# Patient Record
Sex: FEMALE | Race: AMERICAN INDIAN OR ALASKA NATIVE | NOT HISPANIC OR LATINO | URBAN - METROPOLITAN AREA
[De-identification: names, ages, dates, MRNs, and addresses within clinical notes are randomized per-mention and may not be internally consistent; named-entity substitution may affect disease eponyms.]

---

## 2018-08-18 ENCOUNTER — OUTPATIENT (OUTPATIENT)
Dept: OUTPATIENT SERVICES | Facility: HOSPITAL | Age: 58
LOS: 1 days | Discharge: HOME | End: 2018-08-18

## 2018-08-18 DIAGNOSIS — R10.9 UNSPECIFIED ABDOMINAL PAIN: ICD-10-CM

## 2020-08-15 ENCOUNTER — OUTPATIENT (OUTPATIENT)
Dept: OUTPATIENT SERVICES | Facility: HOSPITAL | Age: 60
LOS: 1 days | Discharge: HOME | End: 2020-08-15
Payer: MEDICAID

## 2020-08-15 DIAGNOSIS — R07.9 CHEST PAIN, UNSPECIFIED: ICD-10-CM

## 2020-08-15 DIAGNOSIS — M25.569 PAIN IN UNSPECIFIED KNEE: ICD-10-CM

## 2020-08-15 PROCEDURE — 71046 X-RAY EXAM CHEST 2 VIEWS: CPT | Mod: 26

## 2020-08-15 PROCEDURE — 73562 X-RAY EXAM OF KNEE 3: CPT | Mod: 26,50

## 2020-09-18 ENCOUNTER — OUTPATIENT (OUTPATIENT)
Dept: OUTPATIENT SERVICES | Facility: HOSPITAL | Age: 60
LOS: 1 days | Discharge: HOME | End: 2020-09-18

## 2020-09-18 DIAGNOSIS — Z12.31 ENCOUNTER FOR SCREENING MAMMOGRAM FOR MALIGNANT NEOPLASM OF BREAST: ICD-10-CM

## 2020-09-21 DIAGNOSIS — Z13.820 ENCOUNTER FOR SCREENING FOR OSTEOPOROSIS: ICD-10-CM

## 2020-09-21 DIAGNOSIS — N95.9 UNSPECIFIED MENOPAUSAL AND PERIMENOPAUSAL DISORDER: ICD-10-CM

## 2020-11-28 ENCOUNTER — INPATIENT (INPATIENT)
Facility: HOSPITAL | Age: 60
LOS: 2 days | Discharge: HOME | End: 2020-12-01
Attending: NUCLEAR MEDICINE | Admitting: NUCLEAR MEDICINE
Payer: MEDICAID

## 2020-11-28 VITALS
DIASTOLIC BLOOD PRESSURE: 100 MMHG | TEMPERATURE: 98 F | SYSTOLIC BLOOD PRESSURE: 179 MMHG | RESPIRATION RATE: 18 BRPM | HEART RATE: 95 BPM | OXYGEN SATURATION: 99 %

## 2020-11-28 DIAGNOSIS — I11.0 HYPERTENSIVE HEART DISEASE WITH HEART FAILURE: ICD-10-CM

## 2020-11-28 DIAGNOSIS — R91.1 SOLITARY PULMONARY NODULE: ICD-10-CM

## 2020-11-28 DIAGNOSIS — R77.8 OTHER SPECIFIED ABNORMALITIES OF PLASMA PROTEINS: ICD-10-CM

## 2020-11-28 DIAGNOSIS — I50.30 UNSPECIFIED DIASTOLIC (CONGESTIVE) HEART FAILURE: ICD-10-CM

## 2020-11-28 DIAGNOSIS — R16.0 HEPATOMEGALY, NOT ELSEWHERE CLASSIFIED: ICD-10-CM

## 2020-11-28 DIAGNOSIS — R07.89 OTHER CHEST PAIN: ICD-10-CM

## 2020-11-28 DIAGNOSIS — I21.4 NON-ST ELEVATION (NSTEMI) MYOCARDIAL INFARCTION: ICD-10-CM

## 2020-11-28 DIAGNOSIS — M19.90 UNSPECIFIED OSTEOARTHRITIS, UNSPECIFIED SITE: ICD-10-CM

## 2020-11-28 DIAGNOSIS — E66.01 MORBID (SEVERE) OBESITY DUE TO EXCESS CALORIES: ICD-10-CM

## 2020-11-28 LAB
ALBUMIN SERPL ELPH-MCNC: 4.5 G/DL — SIGNIFICANT CHANGE UP (ref 3.5–5.2)
ALP SERPL-CCNC: 111 U/L — SIGNIFICANT CHANGE UP (ref 30–115)
ALT FLD-CCNC: 19 U/L — SIGNIFICANT CHANGE UP (ref 0–41)
ANION GAP SERPL CALC-SCNC: 11 MMOL/L — SIGNIFICANT CHANGE UP (ref 7–14)
AST SERPL-CCNC: 27 U/L — SIGNIFICANT CHANGE UP (ref 0–41)
BASOPHILS # BLD AUTO: 0.07 K/UL — SIGNIFICANT CHANGE UP (ref 0–0.2)
BASOPHILS NFR BLD AUTO: 0.8 % — SIGNIFICANT CHANGE UP (ref 0–1)
BILIRUB SERPL-MCNC: 0.3 MG/DL — SIGNIFICANT CHANGE UP (ref 0.2–1.2)
BUN SERPL-MCNC: 22 MG/DL — HIGH (ref 10–20)
CALCIUM SERPL-MCNC: 10 MG/DL — SIGNIFICANT CHANGE UP (ref 8.5–10.1)
CHLORIDE SERPL-SCNC: 103 MMOL/L — SIGNIFICANT CHANGE UP (ref 98–110)
CO2 SERPL-SCNC: 26 MMOL/L — SIGNIFICANT CHANGE UP (ref 17–32)
CREAT SERPL-MCNC: 0.8 MG/DL — SIGNIFICANT CHANGE UP (ref 0.7–1.5)
EOSINOPHIL # BLD AUTO: 1.62 K/UL — HIGH (ref 0–0.7)
EOSINOPHIL NFR BLD AUTO: 18.3 % — HIGH (ref 0–8)
GLUCOSE BLDC GLUCOMTR-MCNC: 96 MG/DL — SIGNIFICANT CHANGE UP (ref 70–99)
GLUCOSE SERPL-MCNC: 127 MG/DL — HIGH (ref 70–99)
HCT VFR BLD CALC: 39.8 % — SIGNIFICANT CHANGE UP (ref 37–47)
HGB BLD-MCNC: 13 G/DL — SIGNIFICANT CHANGE UP (ref 12–16)
IMM GRANULOCYTES NFR BLD AUTO: 1 % — HIGH (ref 0.1–0.3)
LYMPHOCYTES # BLD AUTO: 2.18 K/UL — SIGNIFICANT CHANGE UP (ref 1.2–3.4)
LYMPHOCYTES # BLD AUTO: 24.7 % — SIGNIFICANT CHANGE UP (ref 20.5–51.1)
MAGNESIUM SERPL-MCNC: 2.1 MG/DL — SIGNIFICANT CHANGE UP (ref 1.8–2.4)
MCHC RBC-ENTMCNC: 28.8 PG — SIGNIFICANT CHANGE UP (ref 27–31)
MCHC RBC-ENTMCNC: 32.7 G/DL — SIGNIFICANT CHANGE UP (ref 32–37)
MCV RBC AUTO: 88.1 FL — SIGNIFICANT CHANGE UP (ref 81–99)
MONOCYTES # BLD AUTO: 0.47 K/UL — SIGNIFICANT CHANGE UP (ref 0.1–0.6)
MONOCYTES NFR BLD AUTO: 5.3 % — SIGNIFICANT CHANGE UP (ref 1.7–9.3)
NEUTROPHILS # BLD AUTO: 4.4 K/UL — SIGNIFICANT CHANGE UP (ref 1.4–6.5)
NEUTROPHILS NFR BLD AUTO: 49.9 % — SIGNIFICANT CHANGE UP (ref 42.2–75.2)
NRBC # BLD: 0 /100 WBCS — SIGNIFICANT CHANGE UP (ref 0–0)
NT-PROBNP SERPL-SCNC: 1638 PG/ML — HIGH (ref 0–300)
PLATELET # BLD AUTO: 346 K/UL — SIGNIFICANT CHANGE UP (ref 130–400)
POTASSIUM SERPL-MCNC: 5.3 MMOL/L — HIGH (ref 3.5–5)
POTASSIUM SERPL-SCNC: 5.3 MMOL/L — HIGH (ref 3.5–5)
PROT SERPL-MCNC: 7.3 G/DL — SIGNIFICANT CHANGE UP (ref 6–8)
RAPID RVP RESULT: SIGNIFICANT CHANGE UP
RBC # BLD: 4.52 M/UL — SIGNIFICANT CHANGE UP (ref 4.2–5.4)
RBC # FLD: 13.5 % — SIGNIFICANT CHANGE UP (ref 11.5–14.5)
SARS-COV-2 RNA SPEC QL NAA+PROBE: SIGNIFICANT CHANGE UP
SODIUM SERPL-SCNC: 140 MMOL/L — SIGNIFICANT CHANGE UP (ref 135–146)
TROPONIN T SERPL-MCNC: 0.38 NG/ML — CRITICAL HIGH
WBC # BLD: 8.83 K/UL — SIGNIFICANT CHANGE UP (ref 4.8–10.8)
WBC # FLD AUTO: 8.83 K/UL — SIGNIFICANT CHANGE UP (ref 4.8–10.8)

## 2020-11-28 PROCEDURE — 93010 ELECTROCARDIOGRAM REPORT: CPT | Mod: 76

## 2020-11-28 PROCEDURE — 73030 X-RAY EXAM OF SHOULDER: CPT | Mod: 26,RT

## 2020-11-28 PROCEDURE — 71046 X-RAY EXAM CHEST 2 VIEWS: CPT | Mod: 26

## 2020-11-28 PROCEDURE — 99291 CRITICAL CARE FIRST HOUR: CPT

## 2020-11-28 RX ORDER — METOPROLOL TARTRATE 50 MG
25 TABLET ORAL
Refills: 0 | Status: DISCONTINUED | OUTPATIENT
Start: 2020-11-28 | End: 2020-12-01

## 2020-11-28 RX ORDER — CHLORHEXIDINE GLUCONATE 213 G/1000ML
1 SOLUTION TOPICAL
Refills: 0 | Status: DISCONTINUED | OUTPATIENT
Start: 2020-11-28 | End: 2020-12-01

## 2020-11-28 RX ORDER — PANTOPRAZOLE SODIUM 20 MG/1
40 TABLET, DELAYED RELEASE ORAL
Refills: 0 | Status: DISCONTINUED | OUTPATIENT
Start: 2020-11-28 | End: 2020-12-01

## 2020-11-28 RX ORDER — GEMFIBROZIL 600 MG
600 TABLET ORAL
Refills: 0 | Status: DISCONTINUED | OUTPATIENT
Start: 2020-11-28 | End: 2020-11-29

## 2020-11-28 RX ORDER — ASPIRIN/CALCIUM CARB/MAGNESIUM 324 MG
325 TABLET ORAL ONCE
Refills: 0 | Status: COMPLETED | OUTPATIENT
Start: 2020-11-28 | End: 2020-11-28

## 2020-11-28 RX ORDER — ATORVASTATIN CALCIUM 80 MG/1
40 TABLET, FILM COATED ORAL AT BEDTIME
Refills: 0 | Status: DISCONTINUED | OUTPATIENT
Start: 2020-11-28 | End: 2020-12-01

## 2020-11-28 RX ORDER — ENOXAPARIN SODIUM 100 MG/ML
40 INJECTION SUBCUTANEOUS DAILY
Refills: 0 | Status: DISCONTINUED | OUTPATIENT
Start: 2020-11-28 | End: 2020-11-29

## 2020-11-28 RX ORDER — ASPIRIN/CALCIUM CARB/MAGNESIUM 324 MG
81 TABLET ORAL DAILY
Refills: 0 | Status: DISCONTINUED | OUTPATIENT
Start: 2020-11-28 | End: 2020-12-01

## 2020-11-28 RX ADMIN — Medication 25 MILLIGRAM(S): at 22:37

## 2020-11-28 RX ADMIN — Medication 325 MILLIGRAM(S): at 19:42

## 2020-11-28 NOTE — H&P ADULT - NSHPLABSRESULTS_GEN_ALL_CORE
VITAL SIGNS: Last 24 Hours  T(C): 36.8 (28 Nov 2020 21:25), Max: 36.8 (28 Nov 2020 17:52)  T(F): 98.2 (28 Nov 2020 21:25), Max: 98.3 (28 Nov 2020 17:52)  HR: 87 (28 Nov 2020 21:25) (82 - 95)  BP: 169/100 (28 Nov 2020 21:25) (134/76 - 179/100)  BP(mean): 129 (28 Nov 2020 21:25) (100 - 129)  RR: 18 (28 Nov 2020 21:25) (16 - 18)  SpO2: 100% (28 Nov 2020 21:25) (98% - 100%)    LABS:                        13.0   8.83  )-----------( 346      ( 28 Nov 2020 18:35 )             39.8     11-28    140  |  103  |  22<H>  ----------------------------<  127<H>  5.3<H>   |  26  |  0.8    Ca    10.0      28 Nov 2020 18:35  Mg     2.1     11-28    TPro  7.3  /  Alb  4.5  /  TBili  0.3  /  DBili  x   /  AST  27  /  ALT  19  /  AlkPhos  111  11-28          Troponin T, Serum: 0.38 ng/mL <HH> (11-28-20 @ 18:35)      CARDIAC MARKERS ( 28 Nov 2020 18:35 )  x     / 0.38 ng/mL / x     / x     / x

## 2020-11-28 NOTE — ED PROVIDER NOTE - ATTENDING CONTRIBUTION TO CARE
I personally evaluated the patient. I reviewed the Resident’s or Physician Assistant’s note (as assigned above), and agree with the findings and plan except as documented in my note.  60 year old female with a pmh of osteoarthritis presents here for chest pain. Patient states she began having right sided chest pain radiating down her arm that began last night. Pain is 8/10 sharp in nature associated with some sob. Patient follows with Dr. Tom and had a negative CCTA in 2018. Patient denies any fever chills cough n/v abdominal pain.  On exam  CONSTITUTIONAL: WA / WN / NAD  HEAD: NCAT  EYES: PERRL; EOMI;   ENT: Normal pharynx; mucous membranes pink/moist, no erythema.  NECK: Supple; no meningeal signs  CARD: RRR; nl S1/S2; no M/R/G.   RESP: Respiratory rate and effort are normal; breath sounds clear and equal bilaterally.  ABD: Soft, NT ND  MSK/EXT: No gross deformities; full range of motion.  SKIN: Warm and dry;   NEURO: AAOx3  PSYCH: Memory Intact, Normal Affect

## 2020-11-28 NOTE — ED ADULT NURSE REASSESSMENT NOTE - NS ED NURSE REASSESS COMMENT FT1
Pt hand off report received from Ni JORDAN RN. IV flushing w positive blood return, dressing dry and intact. Pt admitted to bedside cardiac monitor. Pt updated on continuing plan of care, awaiting further orders, will continue to monitor/assess.

## 2020-11-28 NOTE — H&P ADULT - ASSESSMENT
IMPRESSION:    NSTEMI      PLAN:    CNS:     HEENT:    PULMONARY:    CARDIOVASCULAR:    GI: GI prophylaxis.  Feeding     RENAL:    INFECTIOUS DISEASE:    HEMATOLOGICAL:  DVT prophylaxis.    ENDOCRINE:  Follow up FS.  Insulin protocol if needed.    MUSCULOSKELETAL:    TIME SPENT:          #Misc  - DVT Prophylaxis:  - Diet:  - GI Prophylaxis:  - Activity:  - IV Fluids:  - Code Status:    Dispo: IMPRESSION:    NSTEMI  Undiagnosed HTN  Hx of osteoarthritis       PLAN:    CNS: Avoid CNS depressants     HEENT: Oral Care     PULMONARY: Montior Pulse Ox. Keep above 93 %    CARDIOVASCULAR:  - Denies active chest pain  - Troponin 0.38. Follow repeat Troponin. If uptrending Therapeutic Lovenox   - EKG non-ischemic. Follow repeat EKG in AM  - s/p 325mg ASA in the ED. Will start ASA 81mg PO QD  - Will start Lipitor 40mg PO QHS and Metoprolol 25mg po BID  - NPO after midnight for possible CCTA in AM  - Follow lipid profile in AM    GI: GI prophylaxis. DASH/TLC diet. NPO after midnight     RENAL: Monitor lytes. Replete as needed     INFECTIOUS DISEASE: Afebrile    HEMATOLOGICAL: DVT prophylaxis.    ENDOCRINE: Follow up FS daily. Follow HbA1C in AM    MUSCULOSKELETAL: AAT.     FULL CODE     59 YO F with PMHx of osteoarthritis presented to the ED with right sided chest pain.    IMPRESSION:  Atypical Chest Pain  NSTEMI  Undiagnosed HTN  Hx of osteoarthritis       PLAN:    CNS: Avoid CNS depressants     HEENT: Oral Care     PULMONARY: Monitor Pulse Ox. Keep above 93 %    CARDIOVASCULAR:  - Troponin 0.38. Follow repeat Troponin. If increasing will start on therapeutic Lovenox   - EKG non-ischemic. Follow repeat EKG in AM  - s/p 325mg ASA in the ED. Will start ASA 81mg PO QD  - Will start Lipitor 40mg PO QHS and Metoprolol 25mg PO BID  - NPO after midnight for CCTA in AM  - Follow lipid profile in AM    GI: GI prophylaxis. DASH/TLC diet. NPO after midnight     RENAL: Monitor lytes. Replete as needed     INFECTIOUS DISEASE: Afebrile    HEMATOLOGICAL: Lovenox for DVT prophylaxis.    ENDOCRINE: Follow up FS daily. Follow HbA1C in AM    MUSCULOSKELETAL: AAT    FULL CODE     61 YO F with PMHx of osteoarthritis presented to the ED with right sided chest pain.    IMPRESSION:  Atypical Chest Pain  NSTEMI  Undiagnosed HTN  Hx of osteoarthritis       PLAN:    CNS: Avoid CNS depressants     HEENT: Oral Care     PULMONARY: Monitor Pulse Ox. Keep above 93 %    CARDIOVASCULAR:  - Troponin 0.38. Follow repeat Troponin. If increasing will start on therapeutic Lovenox   - EKG non-ischemic. Follow repeat EKG in AM  - s/p 325mg ASA in the ED. Will start ASA 81mg PO QD  - Will start Lipitor 40mg PO QHS and Metoprolol 25mg PO BID  - NPO after midnight for CCTA in AM  - Echo  - Follow lipid profile in AM    GI: GI prophylaxis. DASH/TLC diet. NPO after midnight     RENAL: Monitor lytes. Replete as needed     INFECTIOUS DISEASE: Afebrile    HEMATOLOGICAL: Lovenox for DVT prophylaxis.    ENDOCRINE: Follow up FS daily. Follow HbA1C in AM    MUSCULOSKELETAL: AAT    FULL CODE     59 YO F with PMHx of osteoarthritis presented to the ED with right sided chest pain.    IMPRESSION:  Atypical Chest Pain  NSTEMI  Undiagnosed HTN  Hx of osteoarthritis       PLAN:    CNS: Avoid CNS depressants     HEENT: Oral Care     PULMONARY: Monitor Pulse Ox. Keep above 93 %    CARDIOVASCULAR:  - Troponin 0.38. Follow repeat Troponin. If increasing will start on therapeutic Lovenox   - EKG non-ischemic. Follow repeat EKG in AM  - s/p 325mg ASA in the ED. Will start ASA 81mg PO QD  - Will start Lipitor 40mg PO QHS and Metoprolol 25mg PO BID  - NPO after midnight for CCTA in AM  - Follow 2D Echo  - Follow lipid profile in AM    GI: GI prophylaxis. DASH/TLC diet. NPO after midnight     RENAL: Monitor lytes. Replete as needed     INFECTIOUS DISEASE: Afebrile    HEMATOLOGICAL: Lovenox for DVT prophylaxis.    ENDOCRINE: Follow up FS daily. Follow HbA1C in AM    MUSCULOSKELETAL: AAT    FULL CODE     59 YO F with PMHx of osteoarthritis presented to the ED with right sided chest pain.    IMPRESSION:  Atypical Chest Pain  Elevated troponins  HTN  Hx of osteoarthritis       PLAN:    CNS: Avoid CNS depressants     HEENT: Oral Care     PULMONARY: Monitor Pulse Ox. Keep above 93 %    CARDIOVASCULAR:  - Troponin 0.38. Follow repeat Troponin. If increasing will start on therapeutic Lovenox   - EKG non-ischemic. continue to trend ekg for any dynamic changes.   - s/p 325mg ASA in the ED. Will start ASA 81mg PO QD  - Will start Lipitor 40mg PO QHS and Metoprolol 25mg PO BID  - NPO after midnight for CCTA in AM  - tte pending  - check d-dimer and esr / crp    GI: GI prophylaxis. DASH/TLC diet. NPO after midnight     RENAL: Monitor lytes. Replete as needed     INFECTIOUS DISEASE: Afebrile    HEMATOLOGICAL: Lovenox for DVT prophylaxis.    ENDOCRINE: Follow up FS daily. Follow HbA1C in AM    MUSCULOSKELETAL: AAT    FULL CODE     61 YO F with PMHx of osteoarthritis presented to the ED with right sided chest pain.    IMPRESSION:  Atypical Chest Pain  Elevated troponins  HTN  Hx of osteoarthritis       PLAN:    CNS: Avoid CNS depressants     HEENT: Oral Care     PULMONARY: Monitor Pulse Ox. Keep above 93 %    CARDIOVASCULAR:  - Troponin 0.38. Follow repeat Troponin. If increasing will start on therapeutic Lovenox   - EKG non-ischemic. continue to trend ekg for any dynamic changes.   - s/p 325mg ASA in the ED. Will start ASA 81mg PO QD  - Will start Lipitor 40mg PO QHS and Metoprolol 25mg PO BID  - NPO after midnight for CCTA in AM  - tte pending  - check d-dimer and esr / crp  - 2D echo to assess for Takotsubo    GI: GI prophylaxis. DASH/TLC diet. NPO after midnight     RENAL: Monitor lytes. Replete as needed     INFECTIOUS DISEASE: Afebrile    HEMATOLOGICAL: Lovenox for DVT prophylaxis.    ENDOCRINE: Follow up FS daily. Follow HbA1C in AM    MUSCULOSKELETAL: AAT    FULL CODE

## 2020-11-28 NOTE — H&P ADULT - HISTORY OF PRESENT ILLNESS
59 YO F with PMHx of osteoarthritis (takes Naproxen) presented to the ED with chest pain. Chest pain started the night before presentation, right sided radiating to right shoulder and right arm. Pain is not related to  exertion. Patient states she had these episodes of right sided chest pain for around 10-12 years and normalli it goes away on its own after a few minutes. This episode persisted throughout the day so patient came to ED  Patient reports she had a fracture of her right forearm around 10 years ago while shoveling snow and has had right arm pain intermittently since then.   Patient reports history of chronic SOB on related to exertion but all workup has been negative.   Patient denies fevers/chills, nausea, vomiting, abdominal pain.   Patient reports that on her way to the hospital she found out that her brother   In the ED vitals were sig for a BP of 170/100 mmHg. Labs were sig for Troponin of 0.38 and BNP of 1638.  Patient's cardiologist is Dr. Gonzales. ED spoke with Dr Gonzales and he stated that patient had a negative CCTA in 10/2018. He recommended repeat troponin and if uptrending start therapeutic Lovenox 59 YO F with PMHx of osteoarthritis (takes Naproxen) presented to the ED with chest pain. Chest pain started the night before presentation, right sided, radiating to right shoulder and right arm. Pain is not related to exertion and has no aggravating and alleviating factors. She was started on Neproxen 2 months ago by her PCP for musculoskeletal pain and reports no improvement in pain since then. Patient states she had these episodes of right sided chest pain for around 10-12 years and normally it goes away on its own after a few minutes. This episode persisted throughout the day so patient came to ED  She had a fracture of her right forearm around 10 years ago while shoveling snow and has had right arm pain intermittently since then.   She also reports history of chronic SOB related to exertion but all workup has been negative.   Denies fevers/chills, nausea, vomiting, abdominal pain.   Patient reports that on her way to the hospital she found out that her brother   In the ED vitals were sig for a BP of 170/100 mmHg. Labs were sig for Troponin of 0.38 and BNP of 1638.  Patient's cardiologist is Dr. Gonzales. ED spoke with Dr Gonzales and he stated that patient had a negative CCTA in 10/2018. He recommended repeat troponin and if uptrending start therapeutic Lovenox 61 YO F with PMHx of osteoarthritis presented to the ED with chest pain. Chest pain started the night before presentation, right sided, radiating to right shoulder and right arm. Pain is not related to exertion and has no aggravating and alleviating factors. She was started on Neproxen 2 months ago by her PCP for musculoskeletal pain and reports no improvement in pain since then. Patient states she had these episodes of right sided chest pain for around 10-12 years and normally it goes away on its own after a few minutes. This episode persisted throughout the day so patient came to ED  She had a fracture of her right forearm around 10 years ago while shoveling snow and has had right arm pain intermittently since then.   She also reports history of chronic SOB related to exertion but all workup has been negative.   Denies fevers/chills, nausea, vomiting, abdominal pain.   Patient reports that on her way to the hospital she found out that her brother   In the ED vitals were sig for a BP of 170/100 mmHg. Labs were sig for Troponin of 0.38 and BNP of 1638.  Patient's cardiologist is Dr. Gonzales. ED spoke with Dr Gonzales and he stated that patient had a negative CCTA in 10/2018. He recommended repeat troponin and if uptrending start therapeutic Lovenox 61 YO F with PMHx of osteoarthritis presented to the ED with chest pain. Chest pain started the night before presentation, right sided, radiating to right shoulder and right arm. Pain is not related to exertion and has no aggravating and alleviating factors.   Patient states she had these episodes of right sided chest pain for around 10-12 years and normally it goes away on its own after a few minutes. She had a fracture of her right forearm around 10 years ago while shoveling snow and has had right arm pain intermittently since then. This episode persisted throughout the day so patient came to ED. She was started on Naproxen 2 months ago by her PCP for musculoskeletal pain and reports no improvement in pain since then.    She also reports history of chronic SOB related to exertion but all workup has been negative.   Denies fevers/chills, nausea, vomiting, abdominal pain.   Patient reports that on her way to the hospital she found out that her brother   In the ED vitals were sig for a BP of 170/100 mmHg. Labs were sig for Troponin of 0.38 and BNP of 1638.  Patient's cardiologist is Dr. Gonzales. ED spoke with Dr Gonzales and he stated that patient had a negative CCTA in 10/2018. He recommended repeat troponin and if uptrending start therapeutic Lovenox 61 YO F with PMHx of osteoarthritis presented to the ED with chest pain. Chest pain started the night before presentation, right sided, radiating to right shoulder and right arm. Pain is not related to exertion and has no aggravating and alleviating factors.   Patient states she had these episodes of right sided chest pain for around 10-12 years and normally it goes away on its own after a few minutes. She had a fracture of her right forearm around 10 years ago while shoveling snow and has had right arm pain intermittently since then. This episode persisted throughout the day so patient came to ED. She was started on Naproxen 2 months ago by her PCP for musculoskeletal pain and reports no improvement in pain since then.    She also reports history of chronic SOB related to exertion but all workup has been negative.   Denies fevers/chills, nausea, vomiting, abdominal pain.   Patient reports that on her way to the hospital she found out that her brother   In the ED vitals were sig for a BP of 170/100 mmHg. Labs were sig for Troponin of 0.38 and BNP of 1638.  Patient's cardiologist is Dr. Gonzales. ED spoke with Dr Gonzales and he stated that patient had a negative CCTA in 10/2018. He recommended repeat troponin and if uptrending start therapeutic Lovenox

## 2020-11-28 NOTE — H&P ADULT - NSHPPHYSICALEXAM_GEN_ALL_CORE
CONSTITUTIONAL: No acute distress, well-developed, well-groomed, AAOx3  HEAD: Atraumatic, normocephalic  EYES: EOM intact, PERRLA, conjunctiva and sclera clear  ENT: Supple, no masses, no thyromegaly, no bruits, no JVD; moist mucous membranes  PULMONARY: Clear to auscultation bilaterally; no wheezes, rales, or rhonchi  CARDIOVASCULAR: Regular rate and rhythm; no murmurs, rubs, or gallops  GASTROINTESTINAL: Soft, non-tender, non-distended; bowel sounds present  MUSCULOSKELETAL: 2+ peripheral pulses; no clubbing, no cyanosis, no edema  NEUROLOGY: non-focal  SKIN: No rashes or lesions; warm and dry CONSTITUTIONAL: No acute distress, well-developed, well-groomed, AAOx3  HEAD: Atraumatic, normocephalic  EYES: EOM intact, PERRLA, conjunctiva and sclera clear  ENT: Supple, no masses, no thyromegaly, no bruits, no JVD; moist mucous membranes  PULMONARY: Clear to auscultation bilaterally; no wheezes, rales, or rhonchi  CARDIOVASCULAR: Regular rate and rhythm; no murmurs, rubs, or gallops  GASTROINTESTINAL: Soft, non-tender, non-distended; bowel sounds present  MUSCULOSKELETAL: Painful right shoulder movement   NEUROLOGY: non-focal  SKIN: No rashes or lesions; warm and dry

## 2020-11-28 NOTE — H&P ADULT - ATTENDING COMMENTS
Patient seen and examined in the CCU. Discussed with the CCU team on rounds.  Elevated troponins, suggestive of NSTEMI, trended up. However had negative CCTA 2 years ago.  Possible etiologies include development of CAD and NSTEMI, Printzmetal, myocarditis/pericarditis, Takotsubo CM, PE    Repeat troponin  Repeat ECG  CCTA pending  2D echo today to assess LV wall motions  D-dimer  ESR/CRP  Further recommendations after the CTA.

## 2020-11-28 NOTE — ED PROVIDER NOTE - OBJECTIVE STATEMENT
The patient is a 60 year old female with a history of osteoarthritis, right forearm fracture present The patient is a 60 year old female with a history of osteoarthritis presenting for chest pain. Symptoms began this morning, right sided chest pain radiating to right shoulder and right scapular pain; pain is exertional, moderate. States she has had this chest pain ~10-15 years which normally goes away on its own after a few minutes. Chest pain persisted throughout the day so came to ED for eval. Pt also states she fractured her forearm ~10 years ago and has had right arm pain intermittently for years. Associated with shortness of breath on exertion. No fevers/chills, nausea, vomiting, abdominal pain. Pt States she found out her brother  on the way to the hospital. Pt's cardiologist is Dr. Gonzales; had a negative CCTA in 10/2018.

## 2020-11-28 NOTE — ED PROVIDER NOTE - NS ED ROS FT
Eyes:  No visual changes, eye pain or discharge.  ENMT:  No hearing changes, pain, discharge or infections. No neck pain or stiffness.  Cardiac: +chest pain, +SOB + chest pain with exertion.  Respiratory:  No cough or respiratory distress. No hemoptysis. No history of asthma or RAD.  GI:  No nausea, vomiting, diarrhea or abdominal pain.  :  No dysuria, frequency or burning.  MS:  No myalgia, muscle weakness, joint pain or back pain.  Neuro:  No headache or weakness.  No LOC.  Skin:  No skin rash.   Endocrine: No history of thyroid disease or diabetes.

## 2020-11-28 NOTE — ED ADULT NURSE NOTE - CHIEF COMPLAINT QUOTE
pt with c/o right sided chest pain radiating down the right arm. pt with hx of chest pain. pt with c/o sob at times.    son [phone]

## 2020-11-28 NOTE — ED PROVIDER NOTE - PROGRESS NOTE DETAILS
TA: Elevated trop on labs; spoke with patient and patient's son that patient will need to be admitted; placed cardiology consult and call out for patient's cardiologist Dr. Gonzales. TA: Spoke with Dr. Gonzales; stated he will call back with recommendations. TA: Dr. Gonzales recommended CCU admission; stated patient had negative CCTA in 10/2018; recommended second trop in 3 hrs and start lovenox if troponin is increasing. Recommended only aspirin as antiplatet agent. Called cardiology fellow (Tereza) who will see pt in ED. Will admit to CCU under Dr. Cameron. TA: bedside echo within normal limits.

## 2020-11-28 NOTE — ED ADULT TRIAGE NOTE - CHIEF COMPLAINT QUOTE
pt with c/o right sided chest pain radiating down the right arm. pt with hx of chest pain. pt with c/o sob at times. pt with c/o right sided chest pain radiating down the right arm. pt with hx of chest pain. pt with c/o sob at times.    son [phone]

## 2020-11-28 NOTE — ED PROVIDER NOTE - CARE PLAN
Principal Discharge DX:	NSTEMI (non-ST elevated myocardial infarction)   Principal Discharge DX:	NSTEMI (non-ST elevated myocardial infarction)  Secondary Diagnosis:	Chest pain  Secondary Diagnosis:	Elevated troponin

## 2020-11-28 NOTE — ED PROVIDER NOTE - CLINICAL SUMMARY MEDICAL DECISION MAKING FREE TEXT BOX
60 year old female with a pmh of osteoarthritis presents here for chest pain. Patient states she began having right sided chest pain radiating down her arm that began last night. Pain is 8/10 sharp in nature associated with some sob. VS reviewed. Labs imaging ekg obtained and reviewed. Patient found to have a positive troponin. Dr. Tom was called aware of results. Requesting patient only receive aspirin, no heparin/lovenox unless the troponin increases on the repeat. Cardiology fellow consulted. Patient admitted to the CCU for NSTEMI.

## 2020-11-28 NOTE — ED PROVIDER NOTE - PHYSICAL EXAMINATION
CONSTITUTIONAL: elderly female laying in stretcher; in no acute distress.   SKIN: warm, dry  HEAD: Normocephalic; atraumatic.  EYES: normal sclera and conjunctiva   ENT: No nasal discharge; airway clear.  NECK: Supple; non tender.  CARD: S1, S2 normal; no murmurs, gallops, or rubs. Regular rate and rhythm.   RESP: No wheezes, rales or rhonchi.  ABD: soft ntnd  EXT: Normal ROM.  +mild ttp of right humerus.  b/l radial pulses 2+ equal.   LYMPH: No acute cervical adenopathy.  NEURO: Alert, oriented, grossly unremarkable  PSYCH: Cooperative, appropriate.

## 2020-11-28 NOTE — H&P ADULT - NSHPREVIEWOFSYSTEMS_GEN_ALL_CORE
CONSTITUTIONAL: Weakness  EYES: No visual changes, eye pain, or discharge  ENT: No vertigo; No ear pain or change in hearing; No sore throat or difficulty swallowing  NECK: No pain or stiffness  RESPIRATORY: No cough, wheezing, or hemoptysis; No shortness of breath  CARDIOVASCULAR: Chest pain  GASTROINTESTINAL: No abdominal or epigastric pain; No nausea, vomiting, or hematemesis; No diarrhea or constipation; No melena or hematochezia  GENITOURINARY: No dysuria, frequency or hematuria  MUSCULOSKELETAL: Muscle pain  NEUROLOGICAL: No numbness or weakness  SKIN: No itching or rashes

## 2020-11-29 LAB
ALBUMIN SERPL ELPH-MCNC: 4 G/DL — SIGNIFICANT CHANGE UP (ref 3.5–5.2)
ALBUMIN SERPL ELPH-MCNC: 4.2 G/DL — SIGNIFICANT CHANGE UP (ref 3.5–5.2)
ALP SERPL-CCNC: 101 U/L — SIGNIFICANT CHANGE UP (ref 30–115)
ALP SERPL-CCNC: 102 U/L — SIGNIFICANT CHANGE UP (ref 30–115)
ALT FLD-CCNC: 17 U/L — SIGNIFICANT CHANGE UP (ref 0–41)
ALT FLD-CCNC: 18 U/L — SIGNIFICANT CHANGE UP (ref 0–41)
ANION GAP SERPL CALC-SCNC: 10 MMOL/L — SIGNIFICANT CHANGE UP (ref 7–14)
ANION GAP SERPL CALC-SCNC: 11 MMOL/L — SIGNIFICANT CHANGE UP (ref 7–14)
APTT BLD: 37.6 SEC — SIGNIFICANT CHANGE UP (ref 27–39.2)
AST SERPL-CCNC: 25 U/L — SIGNIFICANT CHANGE UP (ref 0–41)
AST SERPL-CCNC: 26 U/L — SIGNIFICANT CHANGE UP (ref 0–41)
BILIRUB SERPL-MCNC: 0.2 MG/DL — SIGNIFICANT CHANGE UP (ref 0.2–1.2)
BILIRUB SERPL-MCNC: 0.3 MG/DL — SIGNIFICANT CHANGE UP (ref 0.2–1.2)
BLD GP AB SCN SERPL QL: SIGNIFICANT CHANGE UP
BUN SERPL-MCNC: 17 MG/DL — SIGNIFICANT CHANGE UP (ref 10–20)
BUN SERPL-MCNC: 18 MG/DL — SIGNIFICANT CHANGE UP (ref 10–20)
CALCIUM SERPL-MCNC: 9.6 MG/DL — SIGNIFICANT CHANGE UP (ref 8.5–10.1)
CALCIUM SERPL-MCNC: 9.7 MG/DL — SIGNIFICANT CHANGE UP (ref 8.5–10.1)
CHLORIDE SERPL-SCNC: 103 MMOL/L — SIGNIFICANT CHANGE UP (ref 98–110)
CHLORIDE SERPL-SCNC: 107 MMOL/L — SIGNIFICANT CHANGE UP (ref 98–110)
CHOLEST SERPL-MCNC: 139 MG/DL — SIGNIFICANT CHANGE UP
CK MB CFR SERPL CALC: 8.9 NG/ML — HIGH (ref 0.6–6.3)
CK MB CFR SERPL CALC: 8.9 NG/ML — HIGH (ref 0.6–6.3)
CK SERPL-CCNC: 110 U/L — SIGNIFICANT CHANGE UP (ref 0–225)
CK SERPL-CCNC: 113 U/L — SIGNIFICANT CHANGE UP (ref 0–225)
CO2 SERPL-SCNC: 23 MMOL/L — SIGNIFICANT CHANGE UP (ref 17–32)
CO2 SERPL-SCNC: 25 MMOL/L — SIGNIFICANT CHANGE UP (ref 17–32)
CREAT SERPL-MCNC: 0.7 MG/DL — SIGNIFICANT CHANGE UP (ref 0.7–1.5)
CREAT SERPL-MCNC: 0.8 MG/DL — SIGNIFICANT CHANGE UP (ref 0.7–1.5)
GLUCOSE SERPL-MCNC: 116 MG/DL — HIGH (ref 70–99)
GLUCOSE SERPL-MCNC: 132 MG/DL — HIGH (ref 70–99)
HCT VFR BLD CALC: 39.7 % — SIGNIFICANT CHANGE UP (ref 37–47)
HDLC SERPL-MCNC: 43 MG/DL — LOW
HGB BLD-MCNC: 12.9 G/DL — SIGNIFICANT CHANGE UP (ref 12–16)
INR BLD: 1.11 RATIO — SIGNIFICANT CHANGE UP (ref 0.65–1.3)
LIPID PNL WITH DIRECT LDL SERPL: 92 MG/DL — SIGNIFICANT CHANGE UP
MAGNESIUM SERPL-MCNC: 2.1 MG/DL — SIGNIFICANT CHANGE UP (ref 1.8–2.4)
MCHC RBC-ENTMCNC: 28.4 PG — SIGNIFICANT CHANGE UP (ref 27–31)
MCHC RBC-ENTMCNC: 32.5 G/DL — SIGNIFICANT CHANGE UP (ref 32–37)
MCV RBC AUTO: 87.3 FL — SIGNIFICANT CHANGE UP (ref 81–99)
NON HDL CHOLESTEROL: 96 MG/DL — SIGNIFICANT CHANGE UP
NRBC # BLD: 0 /100 WBCS — SIGNIFICANT CHANGE UP (ref 0–0)
PLATELET # BLD AUTO: 339 K/UL — SIGNIFICANT CHANGE UP (ref 130–400)
POTASSIUM SERPL-MCNC: 4.3 MMOL/L — SIGNIFICANT CHANGE UP (ref 3.5–5)
POTASSIUM SERPL-MCNC: 4.8 MMOL/L — SIGNIFICANT CHANGE UP (ref 3.5–5)
POTASSIUM SERPL-SCNC: 4.3 MMOL/L — SIGNIFICANT CHANGE UP (ref 3.5–5)
POTASSIUM SERPL-SCNC: 4.8 MMOL/L — SIGNIFICANT CHANGE UP (ref 3.5–5)
PROT SERPL-MCNC: 6.7 G/DL — SIGNIFICANT CHANGE UP (ref 6–8)
PROT SERPL-MCNC: 6.9 G/DL — SIGNIFICANT CHANGE UP (ref 6–8)
PROTHROM AB SERPL-ACNC: 12.8 SEC — SIGNIFICANT CHANGE UP (ref 9.95–12.87)
RBC # BLD: 4.55 M/UL — SIGNIFICANT CHANGE UP (ref 4.2–5.4)
RBC # FLD: 13.5 % — SIGNIFICANT CHANGE UP (ref 11.5–14.5)
SODIUM SERPL-SCNC: 138 MMOL/L — SIGNIFICANT CHANGE UP (ref 135–146)
SODIUM SERPL-SCNC: 141 MMOL/L — SIGNIFICANT CHANGE UP (ref 135–146)
TRIGL SERPL-MCNC: 111 MG/DL — SIGNIFICANT CHANGE UP
TROPONIN T SERPL-MCNC: 0.31 NG/ML — CRITICAL HIGH
TROPONIN T SERPL-MCNC: 0.31 NG/ML — CRITICAL HIGH
TROPONIN T SERPL-MCNC: 0.41 NG/ML — CRITICAL HIGH
TROPONIN T SERPL-MCNC: 0.44 NG/ML — CRITICAL HIGH
WBC # BLD: 9.6 K/UL — SIGNIFICANT CHANGE UP (ref 4.8–10.8)
WBC # FLD AUTO: 9.6 K/UL — SIGNIFICANT CHANGE UP (ref 4.8–10.8)

## 2020-11-29 PROCEDURE — 71045 X-RAY EXAM CHEST 1 VIEW: CPT | Mod: 26

## 2020-11-29 PROCEDURE — 99291 CRITICAL CARE FIRST HOUR: CPT

## 2020-11-29 PROCEDURE — 76705 ECHO EXAM OF ABDOMEN: CPT | Mod: 26

## 2020-11-29 PROCEDURE — 93010 ELECTROCARDIOGRAM REPORT: CPT

## 2020-11-29 PROCEDURE — 93306 TTE W/DOPPLER COMPLETE: CPT | Mod: 26

## 2020-11-29 RX ORDER — SODIUM CHLORIDE 9 MG/ML
1000 INJECTION INTRAMUSCULAR; INTRAVENOUS; SUBCUTANEOUS
Refills: 0 | Status: DISCONTINUED | OUTPATIENT
Start: 2020-11-30 | End: 2020-12-01

## 2020-11-29 RX ORDER — ENOXAPARIN SODIUM 100 MG/ML
110 INJECTION SUBCUTANEOUS
Refills: 0 | Status: DISCONTINUED | OUTPATIENT
Start: 2020-11-29 | End: 2020-11-30

## 2020-11-29 RX ORDER — SODIUM CHLORIDE 9 MG/ML
1000 INJECTION INTRAMUSCULAR; INTRAVENOUS; SUBCUTANEOUS
Refills: 0 | Status: DISCONTINUED | OUTPATIENT
Start: 2020-11-29 | End: 2020-11-29

## 2020-11-29 RX ADMIN — Medication 600 MILLIGRAM(S): at 17:56

## 2020-11-29 RX ADMIN — ENOXAPARIN SODIUM 110 MILLIGRAM(S): 100 INJECTION SUBCUTANEOUS at 17:56

## 2020-11-29 RX ADMIN — ENOXAPARIN SODIUM 110 MILLIGRAM(S): 100 INJECTION SUBCUTANEOUS at 04:41

## 2020-11-29 RX ADMIN — Medication 25 MILLIGRAM(S): at 17:56

## 2020-11-29 RX ADMIN — Medication 25 MILLIGRAM(S): at 05:05

## 2020-11-29 RX ADMIN — Medication 81 MILLIGRAM(S): at 11:04

## 2020-11-29 RX ADMIN — ATORVASTATIN CALCIUM 40 MILLIGRAM(S): 80 TABLET, FILM COATED ORAL at 21:28

## 2020-11-29 RX ADMIN — Medication 600 MILLIGRAM(S): at 05:05

## 2020-11-29 RX ADMIN — CHLORHEXIDINE GLUCONATE 1 APPLICATION(S): 213 SOLUTION TOPICAL at 05:05

## 2020-11-29 RX ADMIN — PANTOPRAZOLE SODIUM 40 MILLIGRAM(S): 20 TABLET, DELAYED RELEASE ORAL at 06:00

## 2020-11-29 NOTE — CONSULT NOTE ADULT - ASSESSMENT
cp is atypical for angina. however some features do suggest angina.   borderline abn troponin but no sig active ekg changes.   neg ccta in 10/2018.   echo normal ef, lae.   obesity.     adv  get coronary cta in am.   if neg then dc home.   if positive then will need cath.   hydrate well before and after ccta.   continue aspirin and lovenox for now.   dc gemfibrozil. 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   get ruq sono.    if ccta neg, dc home with out pt follow up with me.

## 2020-11-29 NOTE — CONSULT NOTE ADULT - SUBJECTIVE AND OBJECTIVE BOX
REE SALCEDO 60y (1960) Female    Daily     Daily Weight in k.5 (2020 05:00)    Patient is a 60y old  Female who presents with a chief complaint of Chest Pain (2020 22:00)      HPI:  59 YO F with PMHx of osteoarthritis presented to the ED with chest pain. Chest pain started the night before presentation, right sided, radiating to right shoulder and right arm. Pain is not related to exertion and has no aggravating and alleviating factors.   Patient states she had these episodes of right sided chest pain for around 10-12 years and normally it goes away on its own after a few minutes. She had a fracture of her right forearm around 10 years ago while shoveling snow and has had right arm pain intermittently since then. This episode persisted throughout the day so patient came to ED. She was started on Naproxen 2 months ago by her PCP for musculoskeletal pain and reports no improvement in pain since then.    She also reports history of chronic SOB related to exertion but all workup has been negative.   Denies fevers/chills, nausea, vomiting, abdominal pain.   Patient reports that on her way to the hospital she found out that her brother   In the ED vitals were sig for a BP of 170/100 mmHg. Labs were sig for Troponin of 0.38 and BNP of 1638.  Patient's cardiologist is Dr. Urbina. ED spoke with Dr Urbina and he stated that patient had a negative CCTA in 10/2018. He recommended repeat troponin and if uptrending start therapeutic Lovenox    (2020 22:00)    cardiology addendum  above reviewed. pt interviewed and examined this afternoon around 2pm in ccu.   pt states this is the same pain which has been bothering her for more than 5 years. it comes at rest. lasts 15 min and is located around right scapular blade and right upper arm and chest. feels heavy and pressure like but not associated with radiation elsewhere. no aggravation by walking. has dyspnea on and off on exertion. put on weight recently as well no relation to food. denies ruq pain after food.   et is about half a mile without symptoms.     PAST MEDICAL & SURGICAL HISTORY:  Osteoarthritis        FAMILY HISTORY:      Home Medications:  Calcium 600+D oral tablet: 1 tab(s) orally 2 times a day (2020 22:17)  gemfibrozil 600 mg oral tablet: 1 tab(s) orally 2 times a day (2020 22:17)  naproxen 500 mg oral tablet: 1 tab(s) orally 2 times a day (2020 22:17)      REVIEW OF SYSTEMS:    CONSTITUTIONAL: No weakness, fevers or chills  EYES/ENT: No visual changes;  No vertigo or throat pain   NECK: No pain or stiffness  RESPIRATORY: see HPI  CARDIOVASCULAR: see HPI  GASTROINTESTINAL: No abdominal or epigastric pain. No nausea, vomiting, or hematemesis; No diarrhea or constipation. No melena or hematochezia.  GENITOURINARY: No dysuria, frequency or hematuria  NEUROLOGICAL: No numbness or weakness  SKIN: No itching, rashes    ON EXAM:  Vital Signs Last 24 Hrs  T(C): 36.5 (2020 20:00), Max: 36.7 (2020 00:00)  T(F): 97.7 (2020 20:00), Max: 98 (2020 00:00)  HR: 70 (2020 20:00) (54 - 94)  BP: 99/63 (2020 18:00) (97/57 - 171/78)  BP(mean): 75 (2020 18:00) (67 - 122)  RR: 20 (2020 20:00) (15 - 28)  SpO2: 97% (2020 20:00) (97% - 100%)    GENERAL: NAD  SKIN: No rashes or lesions  HEAD:  Atraumatic, Normocephalic  EYES:  conjunctiva and sclera clear  ENMT: Moist mucous membranes  NECK: Supple, No JVD  CHEST/LUNG: CTA B/L.  HEART: S1, S2 appreciated. Rate and Rythm are regular.  ABDOMEN/GI: Soft, Nontender, Nondistended; Bowel sounds present  EXTREMITIES:  2+ Peripheral Pulses. No edema  NERVOUS SYSTEM:  Alert & Oriented X3,   LABS:    CARDIAC MARKERS ( 2020 16:40 )  x     / 0.31 ng/mL / x     / x     / x      CARDIAC MARKERS ( 2020 12:15 )  x     / 0.31 ng/mL / x     / x     / x      CARDIAC MARKERS ( 2020 05:30 )  x     / 0.41 ng/mL / 110 U/L / x     / 8.9 ng/mL  CARDIAC MARKERS ( 2020 00:41 )  x     / 0.44 ng/mL / 113 U/L / x     / 8.9 ng/mL  CARDIAC MARKERS ( 2020 18:35 )  x     / 0.38 ng/mL / x     / x     / x                                  12.9   9.60  )-----------( 339      ( 2020 05:30 )             39.7       CBC Full  -  ( 2020 05:30 )  WBC Count : 9.60 K/uL  RBC Count : 4.55 M/uL  Hemoglobin : 12.9 g/dL  Hematocrit : 39.7 %  Platelet Count - Automated : 339 K/uL  Mean Cell Volume : 87.3 fL  Mean Cell Hemoglobin : 28.4 pg  Mean Cell Hemoglobin Concentration : 32.5 g/dL  Auto Neutrophil # : x  Auto Lymphocyte # : x  Auto Monocyte # : x  Auto Eosinophil # : x  Auto Basophil # : x  Auto Neutrophil % : x  Auto Lymphocyte % : x  Auto Monocyte % : x  Auto Eosinophil % : x  Auto Basophil % : x          138  |  103  |  17  ----------------------------<  116<H>  4.8   |  25  |  0.7    Ca    9.6      2020 05:30  Mg     2.1         TPro  6.9  /  Alb  4.2  /  TBili  0.3  /  DBili  x   /  AST  26  /  ALT  18  /  AlkPhos  101        MEDICATIONS  (STANDING):  aspirin  chewable 81 milliGRAM(s) Oral daily  atorvastatin 40 milliGRAM(s) Oral at bedtime  chlorhexidine 4% Liquid 1 Application(s) Topical <User Schedule>  enoxaparin Injectable 110 milliGRAM(s) SubCutaneous two times a day  gemfibrozil 600 milliGRAM(s) Oral two times a day  metoprolol tartrate 25 milliGRAM(s) Oral two times a day  pantoprazole    Tablet 40 milliGRAM(s) Oral before breakfast    MEDICATIONS  (PRN):      20 @ 21:27

## 2020-11-30 ENCOUNTER — TRANSCRIPTION ENCOUNTER (OUTPATIENT)
Age: 60
End: 2020-11-30

## 2020-11-30 LAB
A1C WITH ESTIMATED AVERAGE GLUCOSE RESULT: 6.1 % — HIGH (ref 4–5.6)
ALBUMIN SERPL ELPH-MCNC: 3.8 G/DL — SIGNIFICANT CHANGE UP (ref 3.5–5.2)
ALP SERPL-CCNC: 94 U/L — SIGNIFICANT CHANGE UP (ref 30–115)
ALT FLD-CCNC: 15 U/L — SIGNIFICANT CHANGE UP (ref 0–41)
ANION GAP SERPL CALC-SCNC: 11 MMOL/L — SIGNIFICANT CHANGE UP (ref 7–14)
AST SERPL-CCNC: 20 U/L — SIGNIFICANT CHANGE UP (ref 0–41)
BASOPHILS # BLD AUTO: 0.06 K/UL — SIGNIFICANT CHANGE UP (ref 0–0.2)
BASOPHILS NFR BLD AUTO: 0.6 % — SIGNIFICANT CHANGE UP (ref 0–1)
BILIRUB SERPL-MCNC: 0.3 MG/DL — SIGNIFICANT CHANGE UP (ref 0.2–1.2)
BUN SERPL-MCNC: 19 MG/DL — SIGNIFICANT CHANGE UP (ref 10–20)
CALCIUM SERPL-MCNC: 9.1 MG/DL — SIGNIFICANT CHANGE UP (ref 8.5–10.1)
CHLORIDE SERPL-SCNC: 108 MMOL/L — SIGNIFICANT CHANGE UP (ref 98–110)
CO2 SERPL-SCNC: 24 MMOL/L — SIGNIFICANT CHANGE UP (ref 17–32)
CREAT SERPL-MCNC: 0.9 MG/DL — SIGNIFICANT CHANGE UP (ref 0.7–1.5)
D DIMER BLD IA.RAPID-MCNC: 160 NG/ML DDU — SIGNIFICANT CHANGE UP (ref 0–230)
EOSINOPHIL # BLD AUTO: 1.79 K/UL — HIGH (ref 0–0.7)
EOSINOPHIL NFR BLD AUTO: 18.8 % — HIGH (ref 0–8)
ERYTHROCYTE [SEDIMENTATION RATE] IN BLOOD: 60 MM/HR — HIGH (ref 0–20)
ESTIMATED AVERAGE GLUCOSE: 128 MG/DL — HIGH (ref 68–114)
GLUCOSE SERPL-MCNC: 98 MG/DL — SIGNIFICANT CHANGE UP (ref 70–99)
HCT VFR BLD CALC: 36.6 % — LOW (ref 37–47)
HCV AB S/CO SERPL IA: 0.04 COI — SIGNIFICANT CHANGE UP
HCV AB SERPL-IMP: SIGNIFICANT CHANGE UP
HGB BLD-MCNC: 11.9 G/DL — LOW (ref 12–16)
IMM GRANULOCYTES NFR BLD AUTO: 0.9 % — HIGH (ref 0.1–0.3)
LYMPHOCYTES # BLD AUTO: 2.63 K/UL — SIGNIFICANT CHANGE UP (ref 1.2–3.4)
LYMPHOCYTES # BLD AUTO: 27.6 % — SIGNIFICANT CHANGE UP (ref 20.5–51.1)
MAGNESIUM SERPL-MCNC: 2.2 MG/DL — SIGNIFICANT CHANGE UP (ref 1.8–2.4)
MCHC RBC-ENTMCNC: 28.9 PG — SIGNIFICANT CHANGE UP (ref 27–31)
MCHC RBC-ENTMCNC: 32.5 G/DL — SIGNIFICANT CHANGE UP (ref 32–37)
MCV RBC AUTO: 88.8 FL — SIGNIFICANT CHANGE UP (ref 81–99)
MONOCYTES # BLD AUTO: 0.51 K/UL — SIGNIFICANT CHANGE UP (ref 0.1–0.6)
MONOCYTES NFR BLD AUTO: 5.4 % — SIGNIFICANT CHANGE UP (ref 1.7–9.3)
NEUTROPHILS # BLD AUTO: 4.45 K/UL — SIGNIFICANT CHANGE UP (ref 1.4–6.5)
NEUTROPHILS NFR BLD AUTO: 46.7 % — SIGNIFICANT CHANGE UP (ref 42.2–75.2)
NRBC # BLD: 0 /100 WBCS — SIGNIFICANT CHANGE UP (ref 0–0)
PLATELET # BLD AUTO: 312 K/UL — SIGNIFICANT CHANGE UP (ref 130–400)
POTASSIUM SERPL-MCNC: 4.4 MMOL/L — SIGNIFICANT CHANGE UP (ref 3.5–5)
POTASSIUM SERPL-SCNC: 4.4 MMOL/L — SIGNIFICANT CHANGE UP (ref 3.5–5)
PROT SERPL-MCNC: 6.2 G/DL — SIGNIFICANT CHANGE UP (ref 6–8)
RBC # BLD: 4.12 M/UL — LOW (ref 4.2–5.4)
RBC # FLD: 13.7 % — SIGNIFICANT CHANGE UP (ref 11.5–14.5)
SARS-COV-2 IGG SERPL QL IA: NEGATIVE — SIGNIFICANT CHANGE UP
SARS-COV-2 IGM SERPL IA-ACNC: <0.1 INDEX — SIGNIFICANT CHANGE UP
SODIUM SERPL-SCNC: 143 MMOL/L — SIGNIFICANT CHANGE UP (ref 135–146)
TROPONIN T SERPL-MCNC: 0.27 NG/ML — CRITICAL HIGH
TSH SERPL-MCNC: 4.23 UIU/ML — HIGH (ref 0.27–4.2)
WBC # BLD: 9.53 K/UL — SIGNIFICANT CHANGE UP (ref 4.8–10.8)
WBC # FLD AUTO: 9.53 K/UL — SIGNIFICANT CHANGE UP (ref 4.8–10.8)

## 2020-11-30 PROCEDURE — 75574 CT ANGIO HRT W/3D IMAGE: CPT | Mod: 26

## 2020-11-30 PROCEDURE — 93010 ELECTROCARDIOGRAM REPORT: CPT

## 2020-11-30 PROCEDURE — 71045 X-RAY EXAM CHEST 1 VIEW: CPT | Mod: 26

## 2020-11-30 PROCEDURE — 99232 SBSQ HOSP IP/OBS MODERATE 35: CPT

## 2020-11-30 RX ORDER — ATORVASTATIN CALCIUM 80 MG/1
1 TABLET, FILM COATED ORAL
Qty: 0 | Refills: 0 | DISCHARGE
Start: 2020-11-30

## 2020-11-30 RX ORDER — GEMFIBROZIL 600 MG
1 TABLET ORAL
Qty: 0 | Refills: 0 | DISCHARGE

## 2020-11-30 RX ORDER — METOPROLOL TARTRATE 50 MG
5 TABLET ORAL ONCE
Refills: 0 | Status: DISCONTINUED | OUTPATIENT
Start: 2020-11-30 | End: 2020-12-01

## 2020-11-30 RX ORDER — ACETAMINOPHEN 500 MG
650 TABLET ORAL ONCE
Refills: 0 | Status: COMPLETED | OUTPATIENT
Start: 2020-11-30 | End: 2020-11-30

## 2020-11-30 RX ORDER — SODIUM CHLORIDE 9 MG/ML
1000 INJECTION INTRAMUSCULAR; INTRAVENOUS; SUBCUTANEOUS
Refills: 0 | Status: DISCONTINUED | OUTPATIENT
Start: 2020-11-30 | End: 2020-11-30

## 2020-11-30 RX ORDER — ENOXAPARIN SODIUM 100 MG/ML
40 INJECTION SUBCUTANEOUS DAILY
Refills: 0 | Status: DISCONTINUED | OUTPATIENT
Start: 2020-11-30 | End: 2020-11-30

## 2020-11-30 RX ORDER — ASPIRIN/CALCIUM CARB/MAGNESIUM 324 MG
1 TABLET ORAL
Qty: 0 | Refills: 0 | DISCHARGE
Start: 2020-11-30

## 2020-11-30 RX ORDER — METOPROLOL TARTRATE 50 MG
1 TABLET ORAL
Qty: 0 | Refills: 0 | DISCHARGE
Start: 2020-11-30

## 2020-11-30 RX ADMIN — Medication 25 MILLIGRAM(S): at 05:06

## 2020-11-30 RX ADMIN — CHLORHEXIDINE GLUCONATE 1 APPLICATION(S): 213 SOLUTION TOPICAL at 05:06

## 2020-11-30 RX ADMIN — Medication 81 MILLIGRAM(S): at 11:55

## 2020-11-30 RX ADMIN — Medication 25 MILLIGRAM(S): at 17:41

## 2020-11-30 RX ADMIN — SODIUM CHLORIDE 75 MILLILITER(S): 9 INJECTION INTRAMUSCULAR; INTRAVENOUS; SUBCUTANEOUS at 00:05

## 2020-11-30 RX ADMIN — PANTOPRAZOLE SODIUM 40 MILLIGRAM(S): 20 TABLET, DELAYED RELEASE ORAL at 06:04

## 2020-11-30 RX ADMIN — ENOXAPARIN SODIUM 110 MILLIGRAM(S): 100 INJECTION SUBCUTANEOUS at 05:06

## 2020-11-30 RX ADMIN — Medication 650 MILLIGRAM(S): at 21:04

## 2020-11-30 RX ADMIN — ATORVASTATIN CALCIUM 40 MILLIGRAM(S): 80 TABLET, FILM COATED ORAL at 21:04

## 2020-11-30 NOTE — DISCHARGE NOTE PROVIDER - NSDCMRMEDTOKEN_GEN_ALL_CORE_FT
aspirin 81 mg oral tablet, chewable: 1 tab(s) orally once a day  atorvastatin 40 mg oral tablet: 1 tab(s) orally once a day (at bedtime)  Calcium 600+D oral tablet: 1 tab(s) orally 2 times a day  metoprolol tartrate 25 mg oral tablet: 1 tab(s) orally 2 times a day

## 2020-11-30 NOTE — DISCHARGE NOTE PROVIDER - CARE PROVIDER_API CALL
Angel Urbina  CARDIOVASCULAR DISEASE  [address] [place] [address]  Phone: [phone]  Fax: [phone]  Established Patient  Follow Up Time: 1 week

## 2020-11-30 NOTE — DISCHARGE NOTE PROVIDER - HOSPITAL COURSE
59 YO F with PMHx of osteoarthritis presented to the ED with chest pain. Chest pain started the night before presentation, right sided, radiating to right shoulder and right arm. Pain is not related to exertion and has no aggravating and alleviating factors.   Patient states she had these episodes of right sided chest pain for around 10-12 years and normally it goes away on its own after a few minutes. She had a fracture of her right forearm around 10 years ago while shoveling snow and has had right arm pain intermittently since then. This episode persisted throughout the day so patient came to ED. She was started on Naproxen 2 months ago by her PCP for musculoskeletal pain and reports no improvement in pain since then.    She also reports history of chronic SOB related to exertion but all workup has been negative.   Denies fevers/chills, nausea, vomiting, abdominal pain.   Patient reports that on her way to the hospital she found out that her brother   In the ED vitals were sig for a BP of 170/100 mmHg. Labs were sig for Troponin of 0.38 and BNP of 1638.    pt states this is the same pain which has been bothering her for more than 5 years. it comes at rest. lasts 15 min and is located around right scapular blade and right upper arm and chest. feels heavy and pressure like but not associated with radiation elsewhere. no aggravation by walking. has dyspnea on and off on exertion. put on weight recently as well no relation to food. denies ruq pain after food.   et is about half a mile without symptoms.      cp is atypical for angina. however some features do suggest angina.   borderline abn troponin but no sig active ekg changes.   neg ccta in 10/2018.   echo normal ef, lae.   obesity.   continue aspirin and lovenox for now.   dc gemfibrozil. 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   RUQ sono: shpowed hepatomegaly    61 YO F with PMHx of osteoarthritis presented to the ED with chest pain. Chest pain started the night before presentation, right sided, radiating to right shoulder and right arm. Pain is not related to exertion and has no aggravating and alleviating factors.   Patient states she had these episodes of right sided chest pain for around 10-12 years and normally it goes away on its own after a few minutes. She had a fracture of her right forearm around 10 years ago while shoveling snow and has had right arm pain intermittently since then. This episode persisted throughout the day so patient came to ED. She was started on Naproxen 2 months ago by her PCP for musculoskeletal pain and reports no improvement in pain since then.    She also reports history of chronic SOB related to exertion but all workup has been negative.   Denies fevers/chills, nausea, vomiting, abdominal pain.   Patient reports that on her way to the hospital she found out that her brother   In the ED vitals were sig for a BP of 170/100 mmHg. Labs were sig for Troponin of 0.38 and BNP of 1638.    pt states this is the same pain which has been bothering her for more than 5 years. it comes at rest. lasts 15 min and is located around right scapular blade and right upper arm and chest. feels heavy and pressure like but not associated with radiation elsewhere. no aggravation by walking. has dyspnea on and off on exertion. put on weight recently as well no relation to food. denies ruq pain after food.   et is about half a mile without symptoms.      Atypical chest pain; elevated Yolanda; no EKG changes; NSTEMI  CCTA did not show significant stenosis however LAD was not visualized better  echo normal ef, lae.   cardiac cath showed normal coronaries no CAD.  continue aspirin, BB and statins  dc gemfibrozil. 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   RUQ sono: shpowed hepatomegaly   pulmonary nodule 3mm outpt f/u

## 2020-11-30 NOTE — PROGRESS NOTE ADULT - SUBJECTIVE AND OBJECTIVE BOX
REE SALCEDO 60y Female  MRN#: [id number]   Hospital Day: 2d    SUBJECTIVE  Patient is a 60y old Female who presents with a chief complaint of Chest Pain (29 Nov 2020 21:27)  Currently admitted to medicine with the primary diagnosis of NSTEMI (non-ST elevated myocardial infarction)      INTERVAL HPI AND OVERNIGHT EVENTS:  Patient was examined and seen at bedside. This morning she is resting comfortably in bed and reports no issues or overnight events. Planned for CTA this morning which will help determine her disposition.    REVIEW OF SYMPTOMS:  CONSTITUTIONAL: No weakness, fevers or chills; No headaches  EYES: No visual changes, eye pain, or discharge  ENT: No vertigo; No ear pain or change in hearing; No sore throat or difficulty swallowing  NECK: No pain or stiffness  RESPIRATORY: No cough, wheezing, or hemoptysis; No shortness of breath  CARDIOVASCULAR: No chest pain or palpitations  GASTROINTESTINAL: No abdominal or epigastric pain; No nausea, vomiting, or hematemesis; No diarrhea or constipation; No melena or hematochezia  GENITOURINARY: No dysuria, frequency or hematuria  MUSCULOSKELETAL: No joint pain, no muscle pain, no weakness  NEUROLOGICAL: No numbness or weakness  SKIN: No itching or rashes    OBJECTIVE  PAST MEDICAL & SURGICAL HISTORY  Osteoarthritis      ALLERGIES:  No Known Allergies    MEDICATIONS:  STANDING MEDICATIONS  aspirin  chewable 81 milliGRAM(s) Oral daily  atorvastatin 40 milliGRAM(s) Oral at bedtime  chlorhexidine 4% Liquid 1 Application(s) Topical <User Schedule>  enoxaparin Injectable 110 milliGRAM(s) SubCutaneous two times a day  metoprolol tartrate 25 milliGRAM(s) Oral two times a day  pantoprazole    Tablet 40 milliGRAM(s) Oral before breakfast  sodium chloride 0.9%. 1000 milliLiter(s) IV Continuous <Continuous>    PRN MEDICATIONS      VITAL SIGNS: Last 24 Hours  T(C): 36.2 (30 Nov 2020 04:00), Max: 36.6 (29 Nov 2020 12:00)  T(F): 97.1 (30 Nov 2020 04:00), Max: 97.8 (29 Nov 2020 12:00)  HR: 72 (30 Nov 2020 06:00) (54 - 72)  BP: 117/61 (30 Nov 2020 06:00) (90/51 - 127/79)  BP(mean): 75 (30 Nov 2020 06:00) (69 - 95)  RR: 20 (30 Nov 2020 06:00) (15 - 28)  SpO2: 99% (30 Nov 2020 06:00) (97% - 99%)    LABS:                        11.9   9.53  )-----------( 312      ( 30 Nov 2020 04:29 )             36.6     11-30    143  |  108  |  19  ----------------------------<  98  4.4   |  24  |  0.9    Ca    9.1      30 Nov 2020 04:29  Mg     2.2     11-30    TPro  6.2  /  Alb  3.8  /  TBili  0.3  /  DBili  x   /  AST  20  /  ALT  15  /  AlkPhos  94  11-30    PT/INR - ( 29 Nov 2020 05:30 )   PT: 12.80 sec;   INR: 1.11 ratio         PTT - ( 29 Nov 2020 05:30 )  PTT:37.6 sec      Troponin T, Serum: 0.27 ng/mL <HH> (11-30-20 @ 04:29)  Troponin T, Serum: 0.31 ng/mL <HH> (11-29-20 @ 16:40)  Troponin T, Serum: 0.31 ng/mL <HH> (11-29-20 @ 12:15)      CARDIAC MARKERS ( 30 Nov 2020 04:29 )  x     / 0.27 ng/mL / x     / x     / x      CARDIAC MARKERS ( 29 Nov 2020 16:40 )  x     / 0.31 ng/mL / x     / x     / x      CARDIAC MARKERS ( 29 Nov 2020 12:15 )  x     / 0.31 ng/mL / x     / x     / x      CARDIAC MARKERS ( 29 Nov 2020 05:30 )  x     / 0.41 ng/mL / 110 U/L / x     / 8.9 ng/mL  CARDIAC MARKERS ( 29 Nov 2020 00:41 )  x     / 0.44 ng/mL / 113 U/L / x     / 8.9 ng/mL  CARDIAC MARKERS ( 28 Nov 2020 18:35 )  x     / 0.38 ng/mL / x     / x     / x          RADIOLOGY:      PHYSICAL EXAM:  CONSTITUTIONAL: No acute distress, well-developed, well-groomed, AAOx3  HEAD: Atraumatic, normocephalic  EYES: EOM intact, PERRLA, conjunctiva and sclera clear  ENT: Supple, no masses, no thyromegaly, no bruits, no JVD; moist mucous membranes  PULMONARY: Clear to auscultation bilaterally; no wheezes, rales, or rhonchi  CARDIOVASCULAR: Regular rate and rhythm; no murmurs, rubs, or gallops  GASTROINTESTINAL: Soft, non-tender, non-distended; bowel sounds present  MUSCULOSKELETAL: 2+ peripheral pulses; no clubbing, no cyanosis, no edema  NEUROLOGY: non-focal  SKIN: No rashes or lesions; warm and dry   REE SALCEDO 60y Female  MRN#: [id number]   Hospital Day: 2d    SUBJECTIVE  Patient is a 60y old Female who presents with a chief complaint of Chest Pain (29 Nov 2020 21:27)  Currently admitted to medicine with the primary diagnosis of NSTEMI (non-ST elevated myocardial infarction)        INTERVAL HPI AND OVERNIGHT EVENTS:  Patient was examined and seen at bedside. This morning she is resting comfortably in bed and reports no issues or overnight events. Planned for CTA this morning which will help determine her disposition.    REVIEW OF SYMPTOMS:  CONSTITUTIONAL: No weakness, fevers or chills; No headaches  EYES: No visual changes, eye pain, or discharge  ENT: No vertigo; No ear pain or change in hearing; No sore throat or difficulty swallowing  NECK: No pain or stiffness  RESPIRATORY: No cough, wheezing, or hemoptysis; No shortness of breath  CARDIOVASCULAR: No chest pain or palpitations  GASTROINTESTINAL: No abdominal or epigastric pain; No nausea, vomiting, or hematemesis; No diarrhea or constipation; No melena or hematochezia  GENITOURINARY: No dysuria, frequency or hematuria  MUSCULOSKELETAL: No joint pain, no muscle pain, no weakness  NEUROLOGICAL: No numbness or weakness  SKIN: No itching or rashes    OBJECTIVE  PAST MEDICAL & SURGICAL HISTORY  Osteoarthritis      ALLERGIES:  No Known Allergies    MEDICATIONS:  STANDING MEDICATIONS  aspirin  chewable 81 milliGRAM(s) Oral daily  atorvastatin 40 milliGRAM(s) Oral at bedtime  chlorhexidine 4% Liquid 1 Application(s) Topical <User Schedule>  enoxaparin Injectable 110 milliGRAM(s) SubCutaneous two times a day  metoprolol tartrate 25 milliGRAM(s) Oral two times a day  pantoprazole    Tablet 40 milliGRAM(s) Oral before breakfast  sodium chloride 0.9%. 1000 milliLiter(s) IV Continuous <Continuous>    PRN MEDICATIONS      VITAL SIGNS: Last 24 Hours  T(C): 36.2 (30 Nov 2020 04:00), Max: 36.6 (29 Nov 2020 12:00)  T(F): 97.1 (30 Nov 2020 04:00), Max: 97.8 (29 Nov 2020 12:00)  HR: 72 (30 Nov 2020 06:00) (54 - 72)  BP: 117/61 (30 Nov 2020 06:00) (90/51 - 127/79)  BP(mean): 75 (30 Nov 2020 06:00) (69 - 95)  RR: 20 (30 Nov 2020 06:00) (15 - 28)  SpO2: 99% (30 Nov 2020 06:00) (97% - 99%)    LABS:                        11.9   9.53  )-----------( 312      ( 30 Nov 2020 04:29 )             36.6     11-30    143  |  108  |  19  ----------------------------<  98  4.4   |  24  |  0.9    Ca    9.1      30 Nov 2020 04:29  Mg     2.2     11-30    TPro  6.2  /  Alb  3.8  /  TBili  0.3  /  DBili  x   /  AST  20  /  ALT  15  /  AlkPhos  94  11-30    PT/INR - ( 29 Nov 2020 05:30 )   PT: 12.80 sec;   INR: 1.11 ratio         PTT - ( 29 Nov 2020 05:30 )  PTT:37.6 sec      Troponin T, Serum: 0.27 ng/mL <HH> (11-30-20 @ 04:29)  Troponin T, Serum: 0.31 ng/mL <HH> (11-29-20 @ 16:40)  Troponin T, Serum: 0.31 ng/mL <HH> (11-29-20 @ 12:15)      CARDIAC MARKERS ( 30 Nov 2020 04:29 )  x     / 0.27 ng/mL / x     / x     / x      CARDIAC MARKERS ( 29 Nov 2020 16:40 )  x     / 0.31 ng/mL / x     / x     / x      CARDIAC MARKERS ( 29 Nov 2020 12:15 )  x     / 0.31 ng/mL / x     / x     / x      CARDIAC MARKERS ( 29 Nov 2020 05:30 )  x     / 0.41 ng/mL / 110 U/L / x     / 8.9 ng/mL  CARDIAC MARKERS ( 29 Nov 2020 00:41 )  x     / 0.44 ng/mL / 113 U/L / x     / 8.9 ng/mL  CARDIAC MARKERS ( 28 Nov 2020 18:35 )  x     / 0.38 ng/mL / x     / x     / x          RADIOLOGY:      PHYSICAL EXAM:  CONSTITUTIONAL: No acute distress, well-developed, well-groomed, AAOx3  HEAD: Atraumatic, normocephalic  EYES: EOM intact, PERRLA, conjunctiva and sclera clear  ENT: Supple, no masses, no thyromegaly, no bruits, no JVD; moist mucous membranes  PULMONARY: Clear to auscultation bilaterally; no wheezes, rales, or rhonchi  CARDIOVASCULAR: Regular rate and rhythm; no murmurs, rubs, or gallops  GASTROINTESTINAL: Soft, non-tender, non-distended; bowel sounds present  MUSCULOSKELETAL: 2+ peripheral pulses; no clubbing, no cyanosis, no edema  NEUROLOGY: non-focal  SKIN: No rashes or lesions; warm and dry

## 2020-11-30 NOTE — CHART NOTE - NSCHARTNOTEFT_GEN_A_CORE
CCU DOWNGRADE NOTE:    60y Female transferred to 3C floor from CCU    Patient is a 60y old Female who presents with a chief complaint of Chest Pain (30 Nov 2020 15:56)    The patient is currently admitted for the primary diagnosis of Chest pain      The patient was admitted to the unit for 2 Days.    The patient was never intubated, and was never on pressors.        Code Status: FULL CODE    CCU COURSE OF EVENTS:  -------------------------------------------------------------------------------------------  59 y/o female with PMHx of osteoarthritis presented to the ED with chest pain. Chest pain started the night before presentation, right sided, radiating to right shoulder and right arm. Pain is not related to exertion and has no aggravating and alleviating factors.   Patient states she had these episodes of right sided chest pain for around 10-12 years and normally it goes away on its own after a few minutes. She had a fracture of her right forearm around 10 years ago while shoveling snow and has had right arm pain intermittently since then. This episode persisted throughout the day so patient came to ED. She was started on Naproxen 2 months ago by her PCP for musculoskeletal pain and reports no improvement in pain since then.    She also reports history of chronic SOB related to exertion but all workup has been negative.   Denies fevers/chills, nausea, vomiting, abdominal pain.     In the ED vitals were sig for a BP of 170/100 mmHg. Labs were sig for Troponin of 0.38 and BNP of 1638.  Patient's cardiologist is Dr. Gonzales. ED spoke with Dr Gonzales and he stated that patient had a negative CCTA in 10/2018. He recommended repeat troponin and if uptrending start therapeutic Lovenox       #Atypical Chest Pain  #Elevated troponins  - Troponin trending down   - Continue aspirin and Lovenox for now  - f/u A1c  - Continue Lipitor 40mg PO QHS and Metoprolol 25mg PO BID  - 2D echo reviewed: EF 58%, mildly to moderately enlarged left atrium  - d-dimer 160, esr 60, f/u crp  - CCTA today; Motion degraded coronary CTA. A portion of the mid RCA and mid LAD is not well evaluated due to motion. Otherwise, grossly patent coronary arteries without definite evidence of significant stenosis. The total Agatston coronary artery calcium score equals 0.  CAD-RADS 0/N.  - stable to be downgraded to Telemetry      #HTN  - Lopressor 25mg BID    #Hx of osteoarthritis               -------------------------------------------------------------------------------------------    Current workup in progress:  - monitor in Tele   - f/u CCTA    SIGN OUT AT 11-30-20 @ 17:07 GIVEN TO:

## 2020-11-30 NOTE — PROGRESS NOTE ADULT - ASSESSMENT
MPRESSION:  Atypical Chest Pain  Elevated troponins  HTN  Hx of osteoarthritis       PLAN:    CNS: Avoid CNS depressants     HEENT: Oral Care     PULMONARY: Monitor Pulse Ox. Keep above 93 %    CARDIOVASCULAR:  - Troponin trending down   - Continue aspirin and Lovenox for now  - Continue Lipitor 40mg PO QHS and Metoprolol 25mg PO BID  - 2D echo reviewed  - check d-dimer, esr / crp  - CCTA today; F/U results  - F/U HbA1c    GI: GI prophylaxis. DASH/TLC diet. NPO for CCTA    RENAL: Monitor renal function and lytes. Replete as needed     INFECTIOUS DISEASE: Monitor VS    HEMATOLOGICAL: DVT prophylaxis: on anticoagulation    ENDOCRINE: Follow up FS daily. Insulin protocol if needed    MUSCULOSKELETAL: AAT   MPRESSION:  Atypical Chest Pain  Elevated troponins  HTN  Hx of osteoarthritis       PLAN:    CNS: Avoid CNS depressants     HEENT: Oral Care     PULMONARY: Monitor Pulse Ox. Keep above 93 %    CARDIOVASCULAR:  - Troponin trending down   - Continue aspirin and Lovenox for now  - Continue Lipitor 40mg PO QHS and Metoprolol 25mg PO BID  - 2D echo reviewed  - check d-dimer, esr / crp  - CCTA today; F/U results  - F/U HbA1c    GI: GI prophylaxis. DASH/TLC diet. NPO for CCTA    RENAL: Monitor renal function and lytes. Replete as needed     INFECTIOUS DISEASE: Monitor VS    HEMATOLOGICAL: DVT prophylaxis: on anticoagulation    ENDOCRINE: Follow up FS daily. Insulin protocol if needed    MUSCULOSKELETAL: AAT    Plan to downgrade to 3 c today.  F/u with Dr. Urbina

## 2020-11-30 NOTE — CHART NOTE - NSCHARTNOTEFT_GEN_A_CORE
1. Plan for Left Heart Cardiac Catheterization on 12/01  - There was no adequate visualization of the mid-RCA and mid-LAD was obtained on CCTA on 11/30:  --> CTA 11/30: Motion degraded coronary CTA. A portion of the mid RCA and mid LAD is not well evaluated due to motion. Otherwise, grossly patent coronary arteries without definite evidence of significant stenosis. The total Agatston coronary artery calcium score equals 0.  CAD-RADS 0/N.  - Plan is to perform a left-sided cardiac catheterization on 12/01 per cardiology fellow  - Will keep patient NPO after midnight on 11/30  - Will hold Anticoagulation tonight  - Signed out to intern on call on 3C 1. Plan for Left Heart Cardiac Catheterization on 12/01  - There was no adequate visualization of the mid-RCA and mid-LAD on CCTA obtained on 11/30:  --> CTA 11/30: Motion degraded coronary CTA. A portion of the mid RCA and mid LAD is not well evaluated due to motion. Otherwise, grossly patent coronary arteries without definite evidence of significant stenosis. The total Agatston coronary artery calcium score equals 0.  CAD-RADS 0/N.  - Plan is to perform a left-sided cardiac catheterization on 12/01 per cardiology fellow  - Will keep patient NPO after midnight on 11/30  - Will hold Anticoagulation tonight  - Signed out to intern on call on 3C

## 2020-12-01 ENCOUNTER — TRANSCRIPTION ENCOUNTER (OUTPATIENT)
Age: 60
End: 2020-12-01

## 2020-12-01 VITALS
RESPIRATION RATE: 20 BRPM | SYSTOLIC BLOOD PRESSURE: 136 MMHG | TEMPERATURE: 98 F | DIASTOLIC BLOOD PRESSURE: 63 MMHG | HEART RATE: 79 BPM

## 2020-12-01 LAB
ALBUMIN SERPL ELPH-MCNC: 4 G/DL — SIGNIFICANT CHANGE UP (ref 3.5–5.2)
ALP SERPL-CCNC: 90 U/L — SIGNIFICANT CHANGE UP (ref 30–115)
ALT FLD-CCNC: 15 U/L — SIGNIFICANT CHANGE UP (ref 0–41)
ANION GAP SERPL CALC-SCNC: 14 MMOL/L — SIGNIFICANT CHANGE UP (ref 7–14)
AST SERPL-CCNC: 21 U/L — SIGNIFICANT CHANGE UP (ref 0–41)
BILIRUB SERPL-MCNC: 0.3 MG/DL — SIGNIFICANT CHANGE UP (ref 0.2–1.2)
BUN SERPL-MCNC: 17 MG/DL — SIGNIFICANT CHANGE UP (ref 10–20)
CALCIUM SERPL-MCNC: 9 MG/DL — SIGNIFICANT CHANGE UP (ref 8.5–10.1)
CHLORIDE SERPL-SCNC: 108 MMOL/L — SIGNIFICANT CHANGE UP (ref 98–110)
CO2 SERPL-SCNC: 21 MMOL/L — SIGNIFICANT CHANGE UP (ref 17–32)
CREAT SERPL-MCNC: 0.8 MG/DL — SIGNIFICANT CHANGE UP (ref 0.7–1.5)
CRP SERPL-MCNC: 0.41 MG/DL — HIGH (ref 0–0.4)
D DIMER BLD IA.RAPID-MCNC: 268 NG/ML DDU — HIGH (ref 0–230)
FERRITIN SERPL-MCNC: 103 NG/ML — SIGNIFICANT CHANGE UP (ref 15–150)
GLUCOSE SERPL-MCNC: 111 MG/DL — HIGH (ref 70–99)
HCT VFR BLD CALC: 37.3 % — SIGNIFICANT CHANGE UP (ref 37–47)
HGB BLD-MCNC: 12.1 G/DL — SIGNIFICANT CHANGE UP (ref 12–16)
LDH SERPL L TO P-CCNC: 246 — HIGH (ref 50–242)
MAGNESIUM SERPL-MCNC: 2.1 MG/DL — SIGNIFICANT CHANGE UP (ref 1.8–2.4)
MCHC RBC-ENTMCNC: 28.5 PG — SIGNIFICANT CHANGE UP (ref 27–31)
MCHC RBC-ENTMCNC: 32.4 G/DL — SIGNIFICANT CHANGE UP (ref 32–37)
MCV RBC AUTO: 88 FL — SIGNIFICANT CHANGE UP (ref 81–99)
NRBC # BLD: 0 /100 WBCS — SIGNIFICANT CHANGE UP (ref 0–0)
PLATELET # BLD AUTO: 311 K/UL — SIGNIFICANT CHANGE UP (ref 130–400)
POTASSIUM SERPL-MCNC: 4.2 MMOL/L — SIGNIFICANT CHANGE UP (ref 3.5–5)
POTASSIUM SERPL-SCNC: 4.2 MMOL/L — SIGNIFICANT CHANGE UP (ref 3.5–5)
PROT SERPL-MCNC: 6.2 G/DL — SIGNIFICANT CHANGE UP (ref 6–8)
RBC # BLD: 4.24 M/UL — SIGNIFICANT CHANGE UP (ref 4.2–5.4)
RBC # FLD: 13.8 % — SIGNIFICANT CHANGE UP (ref 11.5–14.5)
SODIUM SERPL-SCNC: 143 MMOL/L — SIGNIFICANT CHANGE UP (ref 135–146)
WBC # BLD: 9.63 K/UL — SIGNIFICANT CHANGE UP (ref 4.8–10.8)
WBC # FLD AUTO: 9.63 K/UL — SIGNIFICANT CHANGE UP (ref 4.8–10.8)

## 2020-12-01 PROCEDURE — 71045 X-RAY EXAM CHEST 1 VIEW: CPT | Mod: 26,77

## 2020-12-01 PROCEDURE — 71045 X-RAY EXAM CHEST 1 VIEW: CPT | Mod: 26

## 2020-12-01 PROCEDURE — 99239 HOSP IP/OBS DSCHRG MGMT >30: CPT

## 2020-12-01 RX ORDER — FUROSEMIDE 40 MG
40 TABLET ORAL ONCE
Refills: 0 | Status: COMPLETED | OUTPATIENT
Start: 2020-12-01 | End: 2020-12-01

## 2020-12-01 RX ORDER — METOPROLOL TARTRATE 50 MG
1 TABLET ORAL
Qty: 60 | Refills: 0
Start: 2020-12-01 | End: 2020-12-30

## 2020-12-01 RX ORDER — ASPIRIN/CALCIUM CARB/MAGNESIUM 324 MG
1 TABLET ORAL
Qty: 30 | Refills: 0
Start: 2020-12-01 | End: 2020-12-30

## 2020-12-01 RX ORDER — ATORVASTATIN CALCIUM 80 MG/1
1 TABLET, FILM COATED ORAL
Qty: 30 | Refills: 0
Start: 2020-12-01 | End: 2020-12-30

## 2020-12-01 RX ADMIN — Medication 81 MILLIGRAM(S): at 07:23

## 2020-12-01 RX ADMIN — SODIUM CHLORIDE 75 MILLILITER(S): 9 INJECTION INTRAMUSCULAR; INTRAVENOUS; SUBCUTANEOUS at 05:46

## 2020-12-01 RX ADMIN — Medication 25 MILLIGRAM(S): at 05:56

## 2020-12-01 RX ADMIN — PANTOPRAZOLE SODIUM 40 MILLIGRAM(S): 20 TABLET, DELAYED RELEASE ORAL at 05:56

## 2020-12-01 RX ADMIN — Medication 40 MILLIGRAM(S): at 07:42

## 2020-12-01 NOTE — CHART NOTE - NSCHARTNOTEFT_GEN_A_CORE
PRE-OP DIAGNOSIS: chest pain, CHF, positive trop, morbid obesity    PROCEDURE: [facility] with coronary angiography    Physician: Dr Urbina  Assistant: Cleo    ANESTHESIA TYPE:  [  ]General Anesthesia  [  ] Sedation  [ x ] Local/Regional    ESTIMATED BLOOD LOSS:    10   mL    CONDITION  [  ] Critical  [  ] Serious  [  ]Fair  [ x ]Good      SPECIMENS REMOVED (IF APPLICABLE): N/A      IV CONTRAST:       50     mL      IMPLANTS (IF APPLICABLE)      FINDINGS    Left Heart Catheterization:  LVEF%: 60  LVEDP: elevated  [ x] Normal Coronary Arteries  [ ] Luminal Irregularities  [ ] Non-obstructive CAD      LEFT HEART CATHETERIZATION                                    Left main normal  LAD:     normal                    Diag: normal     Left Circumflex: normal   OM: normal     Right Coronary Artery: normal   RPDA normal           DOMINANCE: Right    ACCESS: right radial  CLOSURE: D stat    INTERVENTION  none         POST-OP DIAGNOSIS  normal coronary artery,  HFpEF        PLAN OF CARE  [ ] D/C Home today  [ ]  D/C in AM  [ x] Return to In-patient bed      Results of procedure/ plan of care discussed with patient/  in detail.

## 2020-12-01 NOTE — CHART NOTE - NSCHARTNOTEFT_GEN_A_CORE
REE SALCEDO 60y Female  MRN#: [id number]   Hospital Day: 3d    SUBJECTIVE  61 YO F with PMHx of osteoarthritis presented to the ED with chest pain. Chest pain started the night before presentation, right sided, radiating to right shoulder and right arm. Pain is not related to exertion and has no aggravating and alleviating factors.   Patient states she had these episodes of right sided chest pain for around 10-12 years and normally it goes away on its own after a few minutes. She had a fracture of her right forearm around 10 years ago while shoveling snow and has had right arm pain intermittently since then. This episode persisted throughout the day so patient came to ED. She was started on Naproxen 2 months ago by her PCP for musculoskeletal pain and reports no improvement in pain since then.    She also reports history of chronic SOB related to exertion but all workup has been negative.     Denies fevers/chills, nausea, vomiting, abdominal pain.   Patient reports that on her way to the hospital she found out that her brother   In the ED vitals were sig for a BP of 170/100 mmHg. Labs were sig for Troponin of 0.38 and BNP of 1638.    Patient's cardiologist is Dr. Gonzales. ED spoke with Dr Gonzales and he stated that patient had a negative CCTA in 10/2018. He recommended repeat troponin and if uptrending start therapeutic Lovenox.       INTERVAL HPI AND OVERNIGHT EVENTS:  Patient was examined and seen at bedside. This morning she is resting comfortably in bed and reports no issues or overnight events. Patient was downgraded from the CCU, had a cardiac cath in AM which showed normal coronary arteries. Patient is now medically stable for discharge today.     REVIEW OF SYMPTOMS:  CONSTITUTIONAL: No weakness, fevers or chills; No headaches  EYES: No visual changes, eye pain, or discharge  ENT: No vertigo; No ear pain or change in hearing; No sore throat or difficulty swallowing  NECK: No pain or stiffness  RESPIRATORY: No cough, wheezing, or hemoptysis; No shortness of breath  CARDIOVASCULAR: No chest pain or palpitations  GASTROINTESTINAL: No abdominal or epigastric pain; No nausea, vomiting, or hematemesis; No diarrhea or constipation; No melena or hematochezia  GENITOURINARY: No dysuria, frequency or hematuria  MUSCULOSKELETAL: No joint pain, no muscle pain, no weakness  NEUROLOGICAL: No numbness or weakness  SKIN: No itching or rashes    OBJECTIVE  PAST MEDICAL & SURGICAL HISTORY  Osteoarthritis      ALLERGIES:  No Known Allergies    MEDICATIONS:  STANDING MEDICATIONS  aspirin  chewable 81 milliGRAM(s) Oral daily  atorvastatin 40 milliGRAM(s) Oral at bedtime  chlorhexidine 4% Liquid 1 Application(s) Topical <User Schedule>  metoprolol tartrate 25 milliGRAM(s) Oral two times a day  metoprolol tartrate Injectable 5 milliGRAM(s) IV Push once  pantoprazole    Tablet 40 milliGRAM(s) Oral before breakfast  sodium chloride 0.9%. 1000 milliLiter(s) IV Continuous <Continuous>    PRN MEDICATIONS      VITAL SIGNS: Last 24 Hours  T(C): 36.4 (01 Dec 2020 13:33), Max: 36.8 (01 Dec 2020 07:59)  T(F): 97.6 (01 Dec 2020 13:33), Max: 98.3 (01 Dec 2020 07:59)  HR: 79 (01 Dec 2020 13:33) (66 - 79)  BP: 136/63 (01 Dec 2020 13:33) (108/64 - 174/95)  BP(mean): 74 (2020 20:00) (74 - 74)  RR: 20 (01 Dec 2020 13:33) (18 - 26)  SpO2: 93% (01 Dec 2020 07:59) (93% - 97%)    LABS:                        12.1   9.63  )-----------( 311      ( 01 Dec 2020 05:54 )             37.3     12    143  |  108  |  17  ----------------------------<  111<H>  4.2   |  21  |  0.8    Ca    9.0      01 Dec 2020 05:54  Mg     2.1         TPro  6.2  /  Alb  4.0  /  TBili  0.3  /  DBili  x   /  AST  21  /  ALT  15  /  AlkPhos  90  12    CARDIAC MARKERS ( 2020 04:29 )  x     / 0.27 ng/mL / x     / x     / x      CARDIAC MARKERS ( 2020 16:40 )  x     / 0.31 ng/mL / x     / x     / x        RADIOLOGY:    PHYSICAL EXAM:  GEN-NAD, AAOx3  PULM- Clear to auscultation bilaterally, fair air entry  CVS- +s1/s2 RRR no murmurs  GI- soft NT ND +bs, no rebound, no guarding  EXT- no edema

## 2020-12-01 NOTE — PROGRESS NOTE ADULT - SUBJECTIVE AND OBJECTIVE BOX
Patient is a 60y old  Female who presents with a chief complaint of Chest Pain (01 Dec 2020 14:55)    HPI:  59 YO F with PMHx of osteoarthritis presented to the ED with chest pain. Chest pain started the night before presentation, right sided, radiating to right shoulder and right arm. Pain is not related to exertion and has no aggravating and alleviating factors.   Patient states she had these episodes of right sided chest pain for around 10-12 years and normally it goes away on its own after a few minutes. She had a fracture of her right forearm around 10 years ago while shoveling snow and has had right arm pain intermittently since then. This episode persisted throughout the day so patient came to ED. She was started on Naproxen 2 months ago by her PCP for musculoskeletal pain and reports no improvement in pain since then.    She also reports history of chronic SOB related to exertion but all workup has been negative.   Denies fevers/chills, nausea, vomiting, abdominal pain.   Patient reports that on her way to the hospital she found out that her brother   In the ED vitals were sig for a BP of 170/100 mmHg. Labs were sig for Troponin of 0.38 and BNP of 1638.  Patient's cardiologist is Dr. Gonzales. ED spoke with Dr Gonzales and he stated that patient had a negative CCTA in 10/2018. He recommended repeat troponin and if uptrending start therapeutic Lovenox    (2020 22:00)    PAST MEDICAL & SURGICAL HISTORY:  Osteoarthritis    patient seen and examined independently on morning rounds for the first time today, chart reviewed and discussed with the medicine resident.    downgraded from CCU- went for cardiac cath this morning which showed normal coronaries- no chest pain- no sob- awaiting clearance from cardio for discharge home today- also spoke with son on phone     Vital Signs Last 24 Hrs  T(C): 36.4 (01 Dec 2020 13:33), Max: 36.8 (01 Dec 2020 07:59)  T(F): 97.6 (01 Dec 2020 13:33), Max: 98.3 (01 Dec 2020 07:59)  HR: 79 (01 Dec 2020 13:33) (64 - 79)  BP: 136/63 (01 Dec 2020 13:33) (108/64 - 174/95)  BP(mean): 74 (2020 20:00) (74 - 74)  RR: 20 (01 Dec 2020 13:33) (18 - 26)  SpO2: 93% (01 Dec 2020 07:59) (93% - 97%)             PE:  GEN-NAD, AAOx3  PULM- Clear to auscultation bilaterally, fair air entry  CVS- +s1/s2 RRR no murmurs  GI- soft NT ND +bs, no rebound, no guarding  EXT- no edema               12.1   9.63  )-----------( 311      ( 01 Dec 2020 05:54 )             37.3     12    143  |  108  |  17  ----------------------------<  111<H>  4.2   |  21  |  0.8    Ca    9.0      01 Dec 2020 05:54  Mg     2.1         TPro  6.2  /  Alb  4.0  /  TBili  0.3  /  DBili  x   /  AST  21  /  ALT  15  /  AlkPhos  90      CARDIAC MARKERS ( 2020 04:29 )  x     / 0.27 ng/mL / x     / x     / x      CARDIAC MARKERS ( 2020 16:40 )  x     / 0.31 ng/mL / x     / x     / x                MEDICATIONS  (STANDING):  aspirin  chewable 81 milliGRAM(s) Oral daily  atorvastatin 40 milliGRAM(s) Oral at bedtime  chlorhexidine 4% Liquid 1 Application(s) Topical <User Schedule>  metoprolol tartrate 25 milliGRAM(s) Oral two times a day  metoprolol tartrate Injectable 5 milliGRAM(s) IV Push once  pantoprazole    Tablet 40 milliGRAM(s) Oral before breakfast  sodium chloride 0.9%. 1000 milliLiter(s) (75 mL/Hr) IV Continuous <Continuous>

## 2020-12-01 NOTE — PROGRESS NOTE ADULT - SUBJECTIVE AND OBJECTIVE BOX
Pt feels fine. no chest pain no dyspnea. no palpitations.     On Exam:    Vital Signs Last 24 Hrs  T(C): 35.9 (01 Dec 2020 05:00), Max: 37 (30 Nov 2020 12:00)  T(F): 96.7 (01 Dec 2020 05:00), Max: 98.6 (30 Nov 2020 12:00)  HR: 73 (01 Dec 2020 05:00) (56 - 73)  BP: 134/71 (01 Dec 2020 05:00) (100/53 - 134/71)  BP(mean): 74 (30 Nov 2020 20:00) (66 - 103)  RR: 18 (01 Dec 2020 05:00) (17 - 22)  SpO2: 97% (30 Nov 2020 23:53) (95% - 99%)    PHYSICAL EXAM:    · NECK:	 supple  ·RESPIRATORY:  Good air entry bilaterally.  · CARDIOVASCULAR	regular rate and rhythm    ABDOMEN :  no distention; bowel sounds normal; no rebound tenderness; no guarding; no rigidity  · EXTREMITIES: No cyanosis, clubbing or edema.   ·NEUROLOGICAL:   alert and oriented x 3;                          12.1   9.63  )-----------( 311      ( 01 Dec 2020 05:54 )             37.3       11-30    143  |  108  |  19  ----------------------------<  98  4.4   |  24  |  0.9    Ca    9.1      30 Nov 2020 04:29  Mg     2.2     11-30    TPro  6.2  /  Alb  3.8  /  TBili  0.3  /  DBili  x   /  AST  20  /  ALT  15  /  AlkPhos  94  11-30      CARDIAC MARKERS ( 30 Nov 2020 04:29 )  x     / 0.27 ng/mL / x     / x     / x      CARDIAC MARKERS ( 29 Nov 2020 16:40 )  x     / 0.31 ng/mL / x     / x     / x      CARDIAC MARKERS ( 29 Nov 2020 12:15 )  x     / 0.31 ng/mL / x     / x     / x

## 2020-12-01 NOTE — CHART NOTE - NSCHARTNOTEFT_GEN_A_CORE
Pre cath note:    Indication:                     [ ] STEMI                [x ] NSTEMI                 [ ] Acute coronary syndrome                   [ ] Unstable Angina   [ ] high risk  [ ] intermediate risk  [ ] low risk                   [ ] Stable Angina     non-invasive testing:                          Date:                     result: [ ] high risk  [ ] intermediate risk  [ ] low risk                   [ ] Other:     Anti- Anginal medications:                    [ ] not used                       [ x] used                   [ ] not used but strong indication not to use    Ejection Fraction                   [ ] <29            [ ] 30-39%   [ ] 40-49%     [x ]>50%    CHF                   [ ] active (within last 14 days on meds   [ ] Chronic (on meds but no exacerbation)    COPD                   [ ] mild (on chronic bronchodilators)  [ ] moderate (on chronic steroid therapy)      [ ] severe (indication for home O2 or PACO2 >50)    Other risk factors:                     [ ] Previous MI                     [ ] CVA/ stroke                    [ ] carotid stent/ CEA                    [ ] PVD/PAD- (arterial aneurysm, non-palpable pulses, tortuous vessel with inability to insert catheter, infra-renal dissection, renal or subclavian artery stenosis)                    [ ] diabetic                    [ ] previous CABG                    [ ] Renal Failure                           12.1   9.63  )-----------( 311      ( 01 Dec 2020 05:54 )             37.3     11-30    143  |  108  |  19  ----------------------------<  98  4.4   |  24  |  0.9    Ca    9.1      30 Nov 2020 04:29  Mg     2.2     11-30    TPro  6.2  /  Alb  3.8  /  TBili  0.3  /  DBili  x   /  AST  20  /  ALT  15  /  AlkPhos  94  11-30                         -Patient Scheduled for LHC/PCI today  -Keep NPO   -Hold Anticoagulation prior to PCI  -Start IV Fluids NS at : 3ml/Kg for 1 Hr prior to Procedure                                          1ml/KG for 1 Hr prior to Procedure if patient has signs of Heart Failure                                             PREOPERATIVE DAY OF PROCEDURE EVALUATION:  I have personally seen and examined the patient.  I agree with the history and physical which I have reviewed and noted any changes below.  (Signed electronically by __________)  12-01-20 @ 07:26

## 2020-12-01 NOTE — PROGRESS NOTE ADULT - ASSESSMENT
cp is atypical for angina. however some features do suggest angina.   borderline abn troponin but no sig active ekg changes.   neg ccta in 10/2018.   CCTA this admission could not delineate mid LAD although Ca score is zero.   ESR and crp noted to be elevated. s/o some degree of inflammatory condition.   echo normal ef, lae.   obesity.     adv  Coronary angiogram.   risks including but not limited to bleeding. infection, allergic reaction, renal failure, emergency surgery. blood transfusion, tia, mi, cva, death explained  benefits - diagnosis and plan of rx.   alternatives medical therapy  pt understands and agrees.    cp is atypical for angina. however some features do suggest angina.   borderline abn troponin but no sig active ekg changes.   neg ccta in 10/2018.   CCTA this admission could not delineate mid LAD although Ca score is zero.   ESR and crp noted to be elevated. s/o some degree of inflammatory condition.   echo normal ef, lae.   obesity.     adv  Coronary angiogram.   risks including but not limited to bleeding. infection, allergic reaction, renal failure, emergency surgery. blood transfusion, tia, mi, cva, death explained  benefits - diagnosis and plan of rx.   alternatives medical therapy  pt understands and agrees.       addendum  cath performed. normal ef, edp 25 mm Hg. Normal coronaries.     Adv:  continue aspirin for now  will re evaluate at out pt.   continue other home meds.   BUT DC GEMFIBROZIL.  CONTINUE STATINS FOR NOW.     WILL CONSIDER OUT PT REFERRAL FOR RHEUMATOLOGY FOR ELEVATED BIOMARKERS AND  PAIN SYMPTOMS.

## 2020-12-01 NOTE — PROGRESS NOTE ADULT - ASSESSMENT
a/p:    #Atypical Chest pain- unstable angina  -chest pain free now  -downgraded to telemetry  -[facility] this morning---normal coronaries,. EF >50%  -f/u with cardiology prior to discharge home  -remove R radial pressure dressing prior to dc (on exam dressing c/d/i)  -f/u with cardio as otuaptient  -cont asa 81 mg daily, statin 40 mg daily, bblocker (toprol 25 mg bid)  -slightly elevated inflammatory markers--f/u with outpatient pcp and possibly rheum as outpatient    DISCHARGE home today once cleared by cardiology- spoke with fellow    pcp- Dr. Beltran   a/p:    #Atypical Chest pain- unstable angina  -chest pain free now  -downgraded to telemetry  -[facility] this morning---normal coronaries,. EF >50%  -f/u with cardiology prior to discharge home  -remove R radial pressure dressing prior to dc (on exam dressing c/d/i)  -f/u with cardio as otuaptient  -cont asa 81 mg daily, statin 40 mg daily, bblocker (toprol 25 mg bid)  -slightly elevated inflammatory markers--f/u with outpatient pcp and possibly rheum as outpatient    DISCHARGE home today once cleared by cardiology- spoke with fellow    time spendt on discharge >30 minutes including coordination of discharge care    pcp- Dr. Beltran

## 2020-12-01 NOTE — DISCHARGE NOTE NURSING/CASE MANAGEMENT/SOCIAL WORK - PATIENT PORTAL LINK FT
You can access the FollowMyHealth Patient Portal offered by [facility] by registering at the following website: [email or web address]. By joining [email or web address]’s FollowMyHealth portal, you will also be able to view your health information using other applications (apps) compatible with our system.

## 2021-01-07 ENCOUNTER — OUTPATIENT (OUTPATIENT)
Dept: OUTPATIENT SERVICES | Facility: HOSPITAL | Age: 61
LOS: 1 days | Discharge: HOME | End: 2021-01-07
Payer: MEDICAID

## 2021-01-07 DIAGNOSIS — R06.02 SHORTNESS OF BREATH: ICD-10-CM

## 2021-01-07 PROBLEM — M19.90 UNSPECIFIED OSTEOARTHRITIS, UNSPECIFIED SITE: Chronic | Status: ACTIVE | Noted: 2020-11-28

## 2021-01-07 PROCEDURE — 71046 X-RAY EXAM CHEST 2 VIEWS: CPT | Mod: 26

## 2021-03-09 ENCOUNTER — OUTPATIENT (OUTPATIENT)
Dept: OUTPATIENT SERVICES | Facility: HOSPITAL | Age: 61
LOS: 1 days | Discharge: HOME | End: 2021-03-09
Payer: MEDICAID

## 2021-03-09 DIAGNOSIS — M25.511 PAIN IN RIGHT SHOULDER: ICD-10-CM

## 2021-03-09 DIAGNOSIS — R20.2 PARESTHESIA OF SKIN: ICD-10-CM

## 2021-03-09 PROCEDURE — 70160 X-RAY EXAM OF NASAL BONES: CPT | Mod: 26

## 2021-03-09 PROCEDURE — 73030 X-RAY EXAM OF SHOULDER: CPT | Mod: 26,RT

## 2021-03-09 PROCEDURE — 71100 X-RAY EXAM RIBS UNI 2 VIEWS: CPT | Mod: 26,RT

## 2021-03-13 NOTE — ED ADULT TRIAGE NOTE - ACCOMPANIED BY
NIKI prior authorization received via fax  CM scanned and emailed this to [facility] and placed in medical records for scanning  Self

## 2021-10-13 ENCOUNTER — OUTPATIENT (OUTPATIENT)
Dept: OUTPATIENT SERVICES | Facility: HOSPITAL | Age: 61
LOS: 1 days | Discharge: HOME | End: 2021-10-13
Payer: MEDICAID

## 2021-10-13 DIAGNOSIS — M25.572 PAIN IN LEFT ANKLE AND JOINTS OF LEFT FOOT: ICD-10-CM

## 2021-10-13 PROCEDURE — 73610 X-RAY EXAM OF ANKLE: CPT | Mod: 26,LT

## 2021-10-13 PROCEDURE — 73630 X-RAY EXAM OF FOOT: CPT | Mod: 26,LT

## 2021-10-15 ENCOUNTER — EMERGENCY (EMERGENCY)
Facility: HOSPITAL | Age: 61
LOS: 0 days | Discharge: HOME | End: 2021-10-16
Attending: EMERGENCY MEDICINE | Admitting: EMERGENCY MEDICINE
Payer: MEDICAID

## 2021-10-15 VITALS
RESPIRATION RATE: 18 BRPM | DIASTOLIC BLOOD PRESSURE: 72 MMHG | SYSTOLIC BLOOD PRESSURE: 147 MMHG | HEART RATE: 93 BPM | TEMPERATURE: 100 F | OXYGEN SATURATION: 99 % | HEIGHT: 66 IN

## 2021-10-15 DIAGNOSIS — M25.472 EFFUSION, LEFT ANKLE: ICD-10-CM

## 2021-10-15 DIAGNOSIS — M25.572 PAIN IN LEFT ANKLE AND JOINTS OF LEFT FOOT: ICD-10-CM

## 2021-10-15 DIAGNOSIS — Z79.82 LONG TERM (CURRENT) USE OF ASPIRIN: ICD-10-CM

## 2021-10-15 DIAGNOSIS — I10 ESSENTIAL (PRIMARY) HYPERTENSION: ICD-10-CM

## 2021-10-15 PROCEDURE — 99284 EMERGENCY DEPT VISIT MOD MDM: CPT

## 2021-10-15 PROCEDURE — 93971 EXTREMITY STUDY: CPT | Mod: 26,LT

## 2021-10-15 NOTE — ED PROVIDER NOTE - CLINICAL SUMMARY MEDICAL DECISION MAKING FREE TEXT BOX
pt evaluated for ankle pain, duplex negative prelim and XR without acute pathology. advised close follow up with ortho and pt and family agreed. Strict return precautions advised and pt/family verbalized understanding.

## 2021-10-15 NOTE — ED PROVIDER NOTE - ATTENDING CONTRIBUTION TO CARE
61 yo female with PMH HTN presents c/o left ankle pain and swelling x 3 days. Pain denies known trauma, ambulating but with some discomfort. Denies any fevers, weakness, calf pain, CP, SOB, N/V/D, abdominal pain, numbness or leg cramping. Seen by PMD yesterday and treated with pain meds. Did not take meds today.     VITAL SIGNS: noted  CONSTITUTIONAL: Well-developed; well-nourished; in no acute distress  HEAD: Normocephalic; atraumatic  EYES: PERRL, EOM intact; conjunctiva and sclera clear  ENT: No nasal discharge; airway clear. MMM  NECK: Supple; non tender.    CARD: S1, S2 normal; no murmurs, gallops, or rubs. Regular rate and rhythm  RESP: CTAB/L, no wheezes, rales or rhonchi  ABD: Normal bowel sounds; soft; non-distended; non-tender; no hepatosplenomegaly. No CVA tenderness  EXT: Normal ROM bilateral LE, left ankle with mild swelling and tenderness over medial malleolus no erythema, increased warmth, sensation intact. No calf tenderness or edema. Distal pulses intact  NEURO: Alert, oriented. Grossly unremarkable. No focal deficits  SKIN: Skin exam is warm and dry, no acute rash  MS: No midline spinal tenderness

## 2021-10-15 NOTE — ED PROVIDER NOTE - NSFOLLOWUPCLINICS_GEN_ALL_ED_FT
[facility]  Medicine  [address], [place]   Phone: [phone]  Fax:   Follow Up Time: 1-3 Days

## 2021-10-15 NOTE — ED PROVIDER NOTE - PHYSICAL EXAMINATION
Vital Signs: I have reviewed the initial vital signs.  Constitutional: well-nourished, appears stated age, no acute distress  MusculoskeletaL: pelvis, hips, knee nontender; mild firmness to left calf area; + swelling to medial aspect of left ankle; flex/ext intact; sensory intact; pulses dp/pt bilat equal; no redness, warmth or signs of infection/gout;

## 2021-10-15 NOTE — ED PROVIDER NOTE - PATIENT PORTAL LINK FT
You can access the FollowMyHealth Patient Portal offered by [facility] by registering at the following website: [email or web address]. By joining [facility]’s FollowMyHealth portal, you will also be able to view your health information using other applications (apps) compatible with our system.

## 2021-10-15 NOTE — ED PROVIDER NOTE - NS ED ROS FT
Constitutional: (-) fever  Cardiovascular: (-) syncope  Integumentary: (-) rash  Musculoskeltal:  see hpi  Neurological: (-) altered mental status

## 2021-10-15 NOTE — ED PROVIDER NOTE - OBJECTIVE STATEMENT
60 yold female to Ed Pmhx htn c/o left ankle swelling and pain x 3-4 days; pt denies any specific trauma or percipatating event; denies prolonged immobility/hx cancer; pt seen by pmd given meloxicam 15mg, xrays done at [facility] radiology neg(as per pt), scheduled for duplex in 1 week; pt returns here for pain; pt denies fever, chills, cough, n/v, cp/sob;

## 2021-10-16 PROCEDURE — 73610 X-RAY EXAM OF ANKLE: CPT | Mod: 26,LT

## 2021-11-26 ENCOUNTER — OUTPATIENT (OUTPATIENT)
Dept: OUTPATIENT SERVICES | Facility: HOSPITAL | Age: 61
LOS: 1 days | Discharge: HOME | End: 2021-11-26
Payer: MEDICAID

## 2021-11-26 DIAGNOSIS — M79.662 PAIN IN LEFT LOWER LEG: ICD-10-CM

## 2021-11-26 PROBLEM — I10 ESSENTIAL (PRIMARY) HYPERTENSION: Chronic | Status: ACTIVE | Noted: 2021-10-15

## 2021-11-26 PROCEDURE — 76882 US LMTD JT/FCL EVL NVASC XTR: CPT | Mod: 26,LT

## 2022-02-16 NOTE — DISCHARGE NOTE PROVIDER - NSDCCPGOAL_GEN_ALL_CORE_FT
Chief Complaint   Patient presents with   • Arm Pain       History Of Present Illness  Paco is a 73 year old female with h/o hypertension, CVA, hyperlipidemia present with right arm asosicated with numbness and tingling that started the day prior to arrival to ED. She reports shoveling heavy snow a week prior to the onset of symptoms. She denies headache, LOC, dizziness, lightheadedness. Denies any focal weakness. She denies diarreha/constipation. Patient reports compliance with her medications.   She reports that her right arm symptoms have now resolved. Denies any neck pain.       Past Medical History  Past Medical History:   Diagnosis Date   • Essential (primary) hypertension    • Stroke (CMS/HCC)         Surgical History  Past Surgical History:   Procedure Laterality Date   • No past surgeries          Social History  Social History     Tobacco Use   • Smoking status: Never Smoker   • Smokeless tobacco: Never Used   Vaping Use   • Vaping Use: never used   Substance Use Topics   • Alcohol use: Never   • Drug use: Never       Family History  Family History   Problem Relation Age of Onset   • Cancer Father         Colon        Allergies  ALLERGIES:  Patient has no known allergies.    Medications  Medications Prior to Admission   Medication Sig Dispense Refill   • melatonin 5 MG Take 5 mg by mouth nightly as needed. Indications: Trouble Sleeping     • acetaminophen (TYLENOL) 325 MG tablet Take 650 mg by mouth every 6 hours as needed for Pain.     • b complex vitamins capsule Take 1 capsule by mouth daily.     • Omega-3 Fatty Acids (Fish Oil) 1000 MG capsule Take 2 g by mouth daily.     • Magnesium 100 MG Tab Take 100 mg by mouth.     • Ascorbic Acid (vitamin C) 500 MG tablet Take 500 mg by mouth daily.     • cholecalciferol (cholecalciferol) 25 mcg (1,000 units) tablet Take 25 mcg by mouth daily.     • vitamin E (vitamin E) 200 units capsule Take 200 Units by mouth daily.     • atenolol-chlorthalidone (TENORETIC)  50-25 MG per tablet TAKE 1 TABLET EVERY DAY 90 tablet 3   • atorvastatin (LIPITOR) 40 MG tablet Take 1 tablet by mouth daily. 90 tablet 3   • NIFEdipine CC (ADALAT CC) 60 MG 24 hr tablet TAKE 1 TABLET EVERY DAY 90 tablet 3   • potassium CHLORIDE (KLOR-CON M) 20 MEQ karri ER tablet TAKE ONE TAB DAILY 90 tablet 3   • aspirin 81 MG tablet Take 1 tablet by mouth daily.         Review of SystemsAM  Review of Systems   Constitutional: Negative for chills, fever and malaise/fatigue.   HENT: Negative for ear pain and sore throat.    Eyes: Negative for blurred vision, pain and discharge.   Respiratory: Negative for cough, sputum production, shortness of breath and wheezing.    Cardiovascular: Negative for chest pain, palpitations and leg swelling.   Gastrointestinal: Negative for abdominal pain, constipation, diarrhea, nausea and vomiting.   Genitourinary: Negative for dysuria.   Musculoskeletal: Positive for myalgias. Negative for back pain and joint pain.   Skin: Negative for rash.   Neurological: Positive for tingling and sensory change. Negative for dizziness, tremors, speech change, focal weakness, seizures, loss of consciousness, weakness and headaches.   Psychiatric/Behavioral: Negative for depression. The patient is not nervous/anxious.          Last Recorded Vitals  Blood pressure 115/55, pulse 65, temperature 98.1 °F (36.7 °C), temperature source Oral, resp. rate 18, height 5' 5\" (1.651 m), weight 54 kg (119 lb 0.8 oz), SpO2 100 %.    Physical Exam  Vitals and nursing note reviewed.   Constitutional:       General: She is not in acute distress.  HENT:      Head: Normocephalic and atraumatic.   Eyes:      General:         Right eye: No discharge.         Left eye: No discharge.      Conjunctiva/sclera: Conjunctivae normal.   Cardiovascular:      Rate and Rhythm: Normal rate and regular rhythm.      Heart sounds: No murmur heard.    No friction rub.   Pulmonary:      Effort: Pulmonary effort is normal. No respiratory  distress.      Breath sounds: Normal breath sounds. No wheezing or rales.   Abdominal:      General: Bowel sounds are normal.      Palpations: Abdomen is soft.      Tenderness: There is no abdominal tenderness. There is no guarding or rebound.   Musculoskeletal:         General: No tenderness or deformity. Normal range of motion.      Cervical back: Neck supple.      Right lower leg: No edema.      Left lower leg: No edema.   Skin:     Findings: No rash.   Neurological:      Mental Status: She is alert and oriented to person, place, and time.   Psychiatric:         Mood and Affect: Affect normal.         Judgment: Judgment normal.          Imaging  LAST CT:  === 02/14/22 ===    CT HEAD WO CONTRAST    - Narrative -  EXAM: CT HEAD WO CONTRAST    CLINICAL INDICATION:  Right handed numbness    COMPARISON:  03/20/2019    TECHNIQUE: Axial sections were obtained through the brain. Automatic  exposure control utilized for radiation dose reduction. Multiplanar  reformatted imaging was performed.    CONTRAST: No contrast was administered.    FINDINGS: There is enlargement of the ventricles and cortical sulci  compatible with atrophy.  There are periventricular white matter  hypodensities seen in chronic ischemic microvascular disease.  There is a  chronic lacunar infarct or prominent perivascular space seen in the left  basal ganglia.  There is no large acute transcortical infarct identified. There is no intra  or extra-axial hemorrhage. There is no mass effect or midline shift.    The visualized sinuses are clear.  No significant bony abnormalities are  seen.    - Impression -  1.   Atrophy and changes consistent with chronic ischemic microvascular  disease.    2. No definite acute abnormalities are identified.        FOR PHYSICIAN USE ONLY - Please note that this report was generated using  voice recognition software.  If you require clarification or feel that  there has been an error in this report please contact me  through [employer].  Thank you very much for allowing me to participate in the care of  your patient.    Electronically Signed by: ASHLEY WONG M.D.  Signed on: 2/14/2022 6:56 AM        Labs   Recent Results (from the past 24 hour(s))   Basic Metabolic Panel    Collection Time: 02/15/22  2:39 PM   Result Value Ref Range    Fasting Status      Sodium 134 (L) 135 - 145 mmol/L    Potassium 3.2 (L) 3.4 - 5.1 mmol/L    Chloride 96 (L) 98 - 107 mmol/L    Carbon Dioxide 32 21 - 32 mmol/L    Anion Gap 9 (L) 10 - 20 mmol/L    Glucose 95 70 - 99 mg/dL    BUN 18 6 - 20 mg/dL    Creatinine 0.73 0.51 - 0.95 mg/dL    Glomerular Filtration Rate >90 >=60    BUN/ Creatinine Ratio 25 7 - 25    Calcium 8.8 8.4 - 10.2 mg/dL   GLUCOSE, BEDSIDE - POINT OF CARE    Collection Time: 02/15/22  3:25 PM   Result Value Ref Range    GLUCOSE, BEDSIDE - POINT OF CARE 106 (H) 70 - 99 mg/dL   GLUCOSE, BEDSIDE - POINT OF CARE    Collection Time: 02/15/22  8:37 PM   Result Value Ref Range    GLUCOSE, BEDSIDE - POINT OF CARE 189 (H) 70 - 99 mg/dL   Basic Metabolic Panel    Collection Time: 02/16/22  3:56 AM   Result Value Ref Range    Fasting Status      Sodium 130 (L) 135 - 145 mmol/L    Potassium 3.4 3.4 - 5.1 mmol/L    Chloride 97 (L) 98 - 107 mmol/L    Carbon Dioxide 29 21 - 32 mmol/L    Anion Gap 7 (L) 10 - 20 mmol/L    Glucose 114 (H) 70 - 99 mg/dL    BUN 14 6 - 20 mg/dL    Creatinine 0.61 0.51 - 0.95 mg/dL    Glomerular Filtration Rate >90 >=60    BUN/ Creatinine Ratio 23 7 - 25    Calcium 8.9 8.4 - 10.2 mg/dL   Magnesium    Collection Time: 02/16/22  3:56 AM   Result Value Ref Range    Magnesium 1.9 1.7 - 2.4 mg/dL   CBC with Automated Differential (performable only)    Collection Time: 02/16/22  3:56 AM   Result Value Ref Range    WBC 5.6 4.2 - 11.0 K/mcL    RBC 4.67 4.00 - 5.20 mil/mcL    HGB 13.3 12.0 - 15.5 g/dL    HCT 38.2 36.0 - 46.5 %    MCV 81.8 78.0 - 100.0 fl    MCH 28.5 26.0 - 34.0 pg    MCHC 34.8 32.0 - 36.5 g/dL    RDW-CV 13.2  11.0 - 15.0 %    RDW-SD 39.5 39.0 - 50.0 fL     140 - 450 K/mcL    NRBC 0 <=0 /100 WBC    Neutrophil, Percent 34 %    Lymphocytes, Percent 51 %    Mono, Percent 10 %    Eosinophils, Percent 4 %    Basophils, Percent 1 %    Immature Granulocytes 0 %    Absolute Neutrophils 1.9 1.8 - 7.7 K/mcL    Absolute Lymphocytes 2.9 1.0 - 4.0 K/mcL    Absolute Monocytes 0.6 0.3 - 0.9 K/mcL    Absolute Eosinophils  0.2 0.0 - 0.5 K/mcL    Absolute Basophils 0.1 0.0 - 0.3 K/mcL    Absolute Immmature Granulocytes 0.0 0.0 - 0.2 K/mcL   GLUCOSE, BEDSIDE - POINT OF CARE    Collection Time: 02/16/22  6:01 AM   Result Value Ref Range    GLUCOSE, BEDSIDE - POINT OF CARE 119 (H) 70 - 99 mg/dL   GLUCOSE, BEDSIDE - POINT OF CARE    Collection Time: 02/16/22  7:49 AM   Result Value Ref Range    GLUCOSE, BEDSIDE - POINT OF CARE 120 (H) 70 - 99 mg/dL   GLUCOSE, BEDSIDE - POINT OF CARE    Collection Time: 02/16/22 11:37 AM   Result Value Ref Range    GLUCOSE, BEDSIDE - POINT OF CARE 154 (H) 70 - 99 mg/dL        Assessment & Plan    Paresthesia and pain of RUE: now resolved  Low suspicion for acute CVA   Concern for cervical Radiculopathy  Reviewed CT head on admission  Neurology recs are appreciated   - no acute indication for MRI brain   - Pending MRI c-spine  On ASA and statin  PT consulted: no acute PT needs     Right ICA stenosis  Chronic microvascular ischemic disease  Reviewed carotid US  Reviewed lipid panel  Neurology recs are appreciated  On ASA and statin     Hyponatremia  Na of 127 on arrival  Nephrology recs are appreciated  Started on NS   Monitor BMP closely  Improving     Hypokalemia  Replete and monitor     Chronic hypertension  Resume PTA medications, titrate as indicated     Hyperglycemia  a1c = 6.2  SSI    DVT Prophylaxis  DVT ppx: heparin, SCDs    Code Status    Code Status: Full Resuscitation    Primary Care Physician  Abdias Siddiqui MD    I have reviewed the chart and documentation and attest that this patient meets  To get better and follow your care plan as instructed. inpatient criteria.    Tania Mccrary MD  2/16/2022    Patient Privacy Notice      The 21st Century Cures Act makes medical notes like these available to patients in the interest of transparency. Please be advised that this is a medical document. Medical documents are intended to carry relevant information and the clinical opinion of the practitioner. The medical note is intended as medical provider to provider communication, and may appear blunt or direct. It is written in medical language, and may contain abbreviations or verbiage that are unfamiliar.

## 2023-02-02 NOTE — ED PROVIDER NOTE - CCCP TRG CHIEF CMPLNT
Call placed to patient  Convey recommendations of Dr. Lam  No further questions at this time  encounter closed.    chest pain

## 2023-10-06 PROBLEM — Z00.00 ENCOUNTER FOR PREVENTIVE HEALTH EXAMINATION: Status: ACTIVE | Noted: 2023-10-06

## 2023-10-16 ENCOUNTER — APPOINTMENT (OUTPATIENT)
Dept: SURGERY | Facility: CLINIC | Age: 63
End: 2023-10-16

## 2023-10-24 ENCOUNTER — NON-APPOINTMENT (OUTPATIENT)
Age: 63
End: 2023-10-24

## 2023-11-15 ENCOUNTER — EMERGENCY (EMERGENCY)
Facility: HOSPITAL | Age: 63
LOS: 0 days | Discharge: HOME | End: 2023-11-15
Attending: EMERGENCY MEDICINE
Payer: MEDICAID

## 2023-11-15 VITALS
RESPIRATION RATE: 18 BRPM | HEART RATE: 95 BPM | SYSTOLIC BLOOD PRESSURE: 211 MMHG | OXYGEN SATURATION: 99 % | DIASTOLIC BLOOD PRESSURE: 101 MMHG | WEIGHT: 220.02 LBS | TEMPERATURE: 98 F

## 2023-11-15 VITALS
DIASTOLIC BLOOD PRESSURE: 60 MMHG | OXYGEN SATURATION: 99 % | TEMPERATURE: 98 F | SYSTOLIC BLOOD PRESSURE: 145 MMHG | HEART RATE: 90 BPM | RESPIRATION RATE: 18 BRPM

## 2023-11-15 DIAGNOSIS — R10.9 UNSPECIFIED ABDOMINAL PAIN: ICD-10-CM

## 2023-11-15 DIAGNOSIS — I10 ESSENTIAL (PRIMARY) HYPERTENSION: ICD-10-CM

## 2023-11-15 DIAGNOSIS — R11.0 NAUSEA: ICD-10-CM

## 2023-11-15 DIAGNOSIS — E78.5 HYPERLIPIDEMIA, UNSPECIFIED: ICD-10-CM

## 2023-11-15 DIAGNOSIS — R11.2 NAUSEA WITH VOMITING, UNSPECIFIED: ICD-10-CM

## 2023-11-15 LAB
ALBUMIN SERPL ELPH-MCNC: 4.3 G/DL — SIGNIFICANT CHANGE UP (ref 3.5–5.2)
ALBUMIN SERPL ELPH-MCNC: 4.3 G/DL — SIGNIFICANT CHANGE UP (ref 3.5–5.2)
ALP SERPL-CCNC: 124 U/L — HIGH (ref 30–115)
ALP SERPL-CCNC: 124 U/L — HIGH (ref 30–115)
ALT FLD-CCNC: 19 U/L — SIGNIFICANT CHANGE UP (ref 0–41)
ALT FLD-CCNC: 19 U/L — SIGNIFICANT CHANGE UP (ref 0–41)
ANION GAP SERPL CALC-SCNC: 12 MMOL/L — SIGNIFICANT CHANGE UP (ref 7–14)
ANION GAP SERPL CALC-SCNC: 12 MMOL/L — SIGNIFICANT CHANGE UP (ref 7–14)
APPEARANCE UR: CLEAR — SIGNIFICANT CHANGE UP
APPEARANCE UR: CLEAR — SIGNIFICANT CHANGE UP
AST SERPL-CCNC: 17 U/L — SIGNIFICANT CHANGE UP (ref 0–41)
AST SERPL-CCNC: 17 U/L — SIGNIFICANT CHANGE UP (ref 0–41)
BASOPHILS # BLD AUTO: 0.03 K/UL — SIGNIFICANT CHANGE UP (ref 0–0.2)
BASOPHILS # BLD AUTO: 0.03 K/UL — SIGNIFICANT CHANGE UP (ref 0–0.2)
BASOPHILS NFR BLD AUTO: 0.3 % — SIGNIFICANT CHANGE UP (ref 0–1)
BASOPHILS NFR BLD AUTO: 0.3 % — SIGNIFICANT CHANGE UP (ref 0–1)
BILIRUB SERPL-MCNC: 0.6 MG/DL — SIGNIFICANT CHANGE UP (ref 0.2–1.2)
BILIRUB SERPL-MCNC: 0.6 MG/DL — SIGNIFICANT CHANGE UP (ref 0.2–1.2)
BILIRUB UR-MCNC: NEGATIVE — SIGNIFICANT CHANGE UP
BILIRUB UR-MCNC: NEGATIVE — SIGNIFICANT CHANGE UP
BUN SERPL-MCNC: 15 MG/DL — SIGNIFICANT CHANGE UP (ref 10–20)
BUN SERPL-MCNC: 15 MG/DL — SIGNIFICANT CHANGE UP (ref 10–20)
CALCIUM SERPL-MCNC: 9.2 MG/DL — SIGNIFICANT CHANGE UP (ref 8.4–10.5)
CALCIUM SERPL-MCNC: 9.2 MG/DL — SIGNIFICANT CHANGE UP (ref 8.4–10.5)
CHLORIDE SERPL-SCNC: 102 MMOL/L — SIGNIFICANT CHANGE UP (ref 98–110)
CHLORIDE SERPL-SCNC: 102 MMOL/L — SIGNIFICANT CHANGE UP (ref 98–110)
CO2 SERPL-SCNC: 21 MMOL/L — SIGNIFICANT CHANGE UP (ref 17–32)
CO2 SERPL-SCNC: 21 MMOL/L — SIGNIFICANT CHANGE UP (ref 17–32)
COLOR SPEC: YELLOW — SIGNIFICANT CHANGE UP
COLOR SPEC: YELLOW — SIGNIFICANT CHANGE UP
CREAT SERPL-MCNC: 0.7 MG/DL — SIGNIFICANT CHANGE UP (ref 0.7–1.5)
CREAT SERPL-MCNC: 0.7 MG/DL — SIGNIFICANT CHANGE UP (ref 0.7–1.5)
DIFF PNL FLD: NEGATIVE — SIGNIFICANT CHANGE UP
DIFF PNL FLD: NEGATIVE — SIGNIFICANT CHANGE UP
EGFR: 97 ML/MIN/1.73M2 — SIGNIFICANT CHANGE UP
EGFR: 97 ML/MIN/1.73M2 — SIGNIFICANT CHANGE UP
EOSINOPHIL # BLD AUTO: 0.08 K/UL — SIGNIFICANT CHANGE UP (ref 0–0.7)
EOSINOPHIL # BLD AUTO: 0.08 K/UL — SIGNIFICANT CHANGE UP (ref 0–0.7)
EOSINOPHIL NFR BLD AUTO: 0.8 % — SIGNIFICANT CHANGE UP (ref 0–8)
EOSINOPHIL NFR BLD AUTO: 0.8 % — SIGNIFICANT CHANGE UP (ref 0–8)
EPI CELLS # UR: PRESENT
EPI CELLS # UR: PRESENT
GLUCOSE SERPL-MCNC: 136 MG/DL — HIGH (ref 70–99)
GLUCOSE SERPL-MCNC: 136 MG/DL — HIGH (ref 70–99)
GLUCOSE UR QL: NEGATIVE MG/DL — SIGNIFICANT CHANGE UP
GLUCOSE UR QL: NEGATIVE MG/DL — SIGNIFICANT CHANGE UP
HCT VFR BLD CALC: 40.8 % — SIGNIFICANT CHANGE UP (ref 37–47)
HCT VFR BLD CALC: 40.8 % — SIGNIFICANT CHANGE UP (ref 37–47)
HGB BLD-MCNC: 13.8 G/DL — SIGNIFICANT CHANGE UP (ref 12–16)
HGB BLD-MCNC: 13.8 G/DL — SIGNIFICANT CHANGE UP (ref 12–16)
IMM GRANULOCYTES NFR BLD AUTO: 0.5 % — HIGH (ref 0.1–0.3)
IMM GRANULOCYTES NFR BLD AUTO: 0.5 % — HIGH (ref 0.1–0.3)
KETONES UR-MCNC: 15 MG/DL
KETONES UR-MCNC: 15 MG/DL
LEUKOCYTE ESTERASE UR-ACNC: ABNORMAL
LEUKOCYTE ESTERASE UR-ACNC: ABNORMAL
LYMPHOCYTES # BLD AUTO: 1.76 K/UL — SIGNIFICANT CHANGE UP (ref 1.2–3.4)
LYMPHOCYTES # BLD AUTO: 1.76 K/UL — SIGNIFICANT CHANGE UP (ref 1.2–3.4)
LYMPHOCYTES # BLD AUTO: 16.8 % — LOW (ref 20.5–51.1)
LYMPHOCYTES # BLD AUTO: 16.8 % — LOW (ref 20.5–51.1)
MCHC RBC-ENTMCNC: 28.5 PG — SIGNIFICANT CHANGE UP (ref 27–31)
MCHC RBC-ENTMCNC: 28.5 PG — SIGNIFICANT CHANGE UP (ref 27–31)
MCHC RBC-ENTMCNC: 33.8 G/DL — SIGNIFICANT CHANGE UP (ref 32–37)
MCHC RBC-ENTMCNC: 33.8 G/DL — SIGNIFICANT CHANGE UP (ref 32–37)
MCV RBC AUTO: 84.1 FL — SIGNIFICANT CHANGE UP (ref 81–99)
MCV RBC AUTO: 84.1 FL — SIGNIFICANT CHANGE UP (ref 81–99)
MONOCYTES # BLD AUTO: 0.79 K/UL — HIGH (ref 0.1–0.6)
MONOCYTES # BLD AUTO: 0.79 K/UL — HIGH (ref 0.1–0.6)
MONOCYTES NFR BLD AUTO: 7.6 % — SIGNIFICANT CHANGE UP (ref 1.7–9.3)
MONOCYTES NFR BLD AUTO: 7.6 % — SIGNIFICANT CHANGE UP (ref 1.7–9.3)
NEUTROPHILS # BLD AUTO: 7.75 K/UL — HIGH (ref 1.4–6.5)
NEUTROPHILS # BLD AUTO: 7.75 K/UL — HIGH (ref 1.4–6.5)
NEUTROPHILS NFR BLD AUTO: 74 % — SIGNIFICANT CHANGE UP (ref 42.2–75.2)
NEUTROPHILS NFR BLD AUTO: 74 % — SIGNIFICANT CHANGE UP (ref 42.2–75.2)
NITRITE UR-MCNC: NEGATIVE — SIGNIFICANT CHANGE UP
NITRITE UR-MCNC: NEGATIVE — SIGNIFICANT CHANGE UP
NRBC # BLD: 0 /100 WBCS — SIGNIFICANT CHANGE UP (ref 0–0)
NRBC # BLD: 0 /100 WBCS — SIGNIFICANT CHANGE UP (ref 0–0)
PH UR: 7 — SIGNIFICANT CHANGE UP (ref 5–8)
PH UR: 7 — SIGNIFICANT CHANGE UP (ref 5–8)
PLATELET # BLD AUTO: 306 K/UL — SIGNIFICANT CHANGE UP (ref 130–400)
PLATELET # BLD AUTO: 306 K/UL — SIGNIFICANT CHANGE UP (ref 130–400)
PMV BLD: 10.4 FL — SIGNIFICANT CHANGE UP (ref 7.4–10.4)
PMV BLD: 10.4 FL — SIGNIFICANT CHANGE UP (ref 7.4–10.4)
POTASSIUM SERPL-MCNC: 4.1 MMOL/L — SIGNIFICANT CHANGE UP (ref 3.5–5)
POTASSIUM SERPL-MCNC: 4.1 MMOL/L — SIGNIFICANT CHANGE UP (ref 3.5–5)
POTASSIUM SERPL-SCNC: 4.1 MMOL/L — SIGNIFICANT CHANGE UP (ref 3.5–5)
POTASSIUM SERPL-SCNC: 4.1 MMOL/L — SIGNIFICANT CHANGE UP (ref 3.5–5)
PROT SERPL-MCNC: 7.1 G/DL — SIGNIFICANT CHANGE UP (ref 6–8)
PROT SERPL-MCNC: 7.1 G/DL — SIGNIFICANT CHANGE UP (ref 6–8)
PROT UR-MCNC: SIGNIFICANT CHANGE UP MG/DL
PROT UR-MCNC: SIGNIFICANT CHANGE UP MG/DL
RBC # BLD: 4.85 M/UL — SIGNIFICANT CHANGE UP (ref 4.2–5.4)
RBC # BLD: 4.85 M/UL — SIGNIFICANT CHANGE UP (ref 4.2–5.4)
RBC # FLD: 13.3 % — SIGNIFICANT CHANGE UP (ref 11.5–14.5)
RBC # FLD: 13.3 % — SIGNIFICANT CHANGE UP (ref 11.5–14.5)
RBC CASTS # UR COMP ASSIST: 0 /HPF — SIGNIFICANT CHANGE UP (ref 0–4)
RBC CASTS # UR COMP ASSIST: 0 /HPF — SIGNIFICANT CHANGE UP (ref 0–4)
SODIUM SERPL-SCNC: 135 MMOL/L — SIGNIFICANT CHANGE UP (ref 135–146)
SODIUM SERPL-SCNC: 135 MMOL/L — SIGNIFICANT CHANGE UP (ref 135–146)
SP GR SPEC: 1.02 — SIGNIFICANT CHANGE UP (ref 1–1.03)
SP GR SPEC: 1.02 — SIGNIFICANT CHANGE UP (ref 1–1.03)
UROBILINOGEN FLD QL: 1 MG/DL — SIGNIFICANT CHANGE UP (ref 0.2–1)
UROBILINOGEN FLD QL: 1 MG/DL — SIGNIFICANT CHANGE UP (ref 0.2–1)
WBC # BLD: 10.46 K/UL — SIGNIFICANT CHANGE UP (ref 4.8–10.8)
WBC # BLD: 10.46 K/UL — SIGNIFICANT CHANGE UP (ref 4.8–10.8)
WBC # FLD AUTO: 10.46 K/UL — SIGNIFICANT CHANGE UP (ref 4.8–10.8)
WBC # FLD AUTO: 10.46 K/UL — SIGNIFICANT CHANGE UP (ref 4.8–10.8)
WBC UR QL: 6 /HPF — HIGH (ref 0–5)
WBC UR QL: 6 /HPF — HIGH (ref 0–5)

## 2023-11-15 PROCEDURE — 74177 CT ABD & PELVIS W/CONTRAST: CPT | Mod: MA

## 2023-11-15 PROCEDURE — 96374 THER/PROPH/DIAG INJ IV PUSH: CPT | Mod: XU

## 2023-11-15 PROCEDURE — 99285 EMERGENCY DEPT VISIT HI MDM: CPT

## 2023-11-15 PROCEDURE — 99284 EMERGENCY DEPT VISIT MOD MDM: CPT | Mod: 25

## 2023-11-15 PROCEDURE — 96375 TX/PRO/DX INJ NEW DRUG ADDON: CPT

## 2023-11-15 PROCEDURE — 74177 CT ABD & PELVIS W/CONTRAST: CPT | Mod: 26,MA

## 2023-11-15 RX ORDER — ONDANSETRON 8 MG/1
4 TABLET, FILM COATED ORAL ONCE
Refills: 0 | Status: COMPLETED | OUTPATIENT
Start: 2023-11-15 | End: 2023-11-15

## 2023-11-15 RX ORDER — SODIUM CHLORIDE 9 MG/ML
1000 INJECTION INTRAMUSCULAR; INTRAVENOUS; SUBCUTANEOUS ONCE
Refills: 0 | Status: COMPLETED | OUTPATIENT
Start: 2023-11-15 | End: 2023-11-15

## 2023-11-15 RX ORDER — MORPHINE SULFATE 50 MG/1
4 CAPSULE, EXTENDED RELEASE ORAL ONCE
Refills: 0 | Status: DISCONTINUED | OUTPATIENT
Start: 2023-11-15 | End: 2023-11-15

## 2023-11-15 RX ADMIN — ONDANSETRON 4 MILLIGRAM(S): 8 TABLET, FILM COATED ORAL at 03:02

## 2023-11-15 RX ADMIN — MORPHINE SULFATE 4 MILLIGRAM(S): 50 CAPSULE, EXTENDED RELEASE ORAL at 03:02

## 2023-11-15 RX ADMIN — SODIUM CHLORIDE 1000 MILLILITER(S): 9 INJECTION INTRAMUSCULAR; INTRAVENOUS; SUBCUTANEOUS at 03:12

## 2023-11-15 NOTE — ED PROVIDER NOTE - PATIENT PORTAL LINK FT
You can access the FollowMyHealth Patient Portal offered by [facility] by registering at the following website: [email or web address]. By joining [employer]’s FollowMyHealth portal, you will also be able to view your health information using other applications (apps) compatible with our system.

## 2023-11-15 NOTE — ED PROVIDER NOTE - NS ED ATTENDING STATEMENT MOD
This was a shared visit with the LUCIANA. I reviewed and verified the documentation and independently performed the documented:

## 2023-11-15 NOTE — ED PROVIDER NOTE - PROGRESS NOTE DETAILS
Gretchen: received transfer from [facility], patient presented with L flank pain, nausea -- UA without large blood, labs unremarkable. Patient transferred for CT scan to  renal colic.

## 2023-11-15 NOTE — ED PROVIDER NOTE - OBJECTIVE STATEMENT
63 year old female past medical history of Hypertension, HLD comes to emergency room for left flank pain. patient states that started yesterday and has been on and off and now worse tonight. + nausea no vomiting. no fever/chills. no chest pain no shortness of breath.

## 2023-11-15 NOTE — ED ADULT NURSE REASSESSMENT NOTE - NS ED NURSE REASSESS COMMENT FT1
patient to be transferred to [place] ED for CT scan, report called to charge NATASHA Geller, awaiting EMS transport

## 2023-11-15 NOTE — ED PROVIDER NOTE - PHYSICAL EXAMINATION
Physical Exam    Vital Signs: I have reviewed the initial vital signs.  Constitutional: well-nourished, appears stated age, no acute distress  Eyes: Conjunctiva pink, Sclera clear, PERRLA, EOMI.  Cardiovascular: S1 and S2, regular rate, regular rhythm, well-perfused extremities, radial pulses equal and 2+  Respiratory: unlabored respiratory effort, clear to auscultation bilaterally no wheezing, rales and rhonchi  Gastrointestinal: soft, +CVA tenderness and left flank tenderness, abd non tender, no pulsatile mass, normal bowl sounds  Musculoskeletal: supple neck, no lower extremity edema, no midline tenderness  Integumentary: warm, dry, no rash  Neurologic: awake, alert, cranial nerves II-XII grossly intact, extremities’ motor and sensory functions grossly intact  Psychiatric: appropriate mood, appropriate affect

## 2023-11-15 NOTE — ED ADULT NURSE REASSESSMENT NOTE - NS ED NURSE REASSESS COMMENT FT1
Pt was a transfer from [facility] for CT scan of abdomen. Pt assessed and c/o abdominal pain Pt was a transfer from [facility] for CT scan of abdomen. Pt assessed and VS stable. Pt updated on plan of care and verbalized understanding.

## 2023-11-15 NOTE — ED PROVIDER NOTE - ATTENDING APP SHARED VISIT CONTRIBUTION OF CARE
I reviewed and verified the documentation and  and independently performed the documented  MDM.  63yF  hld HTN HLD  pw left flank pain since yesterday worse today constant since 2pm, worse with movement. nonradiating no urinary changes no fever  + NV, no paresthesiae numbness of extremities no cough sob .  + left CVA tenderness,  + left paralumbar tenderness skin no rash abd soft nontender 5/5 strength L, resp cta bl .    labs urine CT    (CT [place] - will transfer [facility]  stable for transport )

## 2023-11-15 NOTE — ED PROVIDER NOTE - CLINICAL SUMMARY MEDICAL DECISION MAKING FREE TEXT BOX
CT, labs, UA unremarkable and patient continues to be pain free. Unclear etiology of acute flank pain however no signs of acute injury or infection to warrant further testing, admission.    Will dc home with sx monitoring, return precautions, follow up.

## 2023-11-15 NOTE — ED PROVIDER NOTE - NSFOLLOWUPINSTRUCTIONS_ED_ALL_ED_FT
PLEASE FOLLOW UP WITH YOUR DOCTOR. CALL FOR AN APPOINTMENT.     Flank Pain, Adult  Flank pain is pain that is located on the side of the body between the upper abdomen and the spine. This area is called the flank. The pain may occur over a short period of time (acute), or it may be long-term or recurring (chronic). It may be mild or severe. Flank pain can be caused by many things, including:  Muscle soreness or injury.  Kidney infection, kidney stones, or kidney disease.  Stress.  A disease of the spine (vertebral disk disease).  A lung infection (pneumonia).  Fluid around the lungs (pulmonary edema).  A skin rash caused by the chickenpox virus (shingles).  Tumors that affect the back of the abdomen.  Gallbladder disease.  Follow these instructions at home:  A comparison of three sample cups showing dark yellow, yellow, and pale yellow urine.  Drink enough fluid to keep your urine pale yellow.  Rest as told by your health care provider.  Take over-the-counter and prescription medicines only as told by your health care provider.  Keep a journal to track what has caused your flank pain and what has made it feel better.  Keep all follow-up visits. This is important.  Contact a health care provider if:  Your pain is not controlled with medicine.  You have new symptoms.  Your pain gets worse.  Your symptoms last longer than 2–3 days.  You have trouble urinating or you are urinating very frequently.  Get help right away if:  You have trouble breathing or you are short of breath.  Your abdomen hurts or it is swollen or red.  You have nausea or vomiting.  You feel faint, or you faint.  You have blood in your urine.  You have flank pain and a fever.  These symptoms may represent a serious problem that is an emergency. Do not wait to see if the symptoms will go away. Get medical help right away. Call your local emergency services (911 in the [place] Do not drive yourself to the hospital.    Summary  Flank pain is pain that is located on the side of the body between the upper abdomen and the spine.  The pain may occur over a short period of time (acute), or it may be long-term or recurring (chronic). It may be mild or severe.  Flank pain can be caused by many things.  Contact your health care provider if your symptoms get worse or last longer than 2–3 days.  This information is not intended to replace advice given to you by your health care provider. Make sure you discuss any questions you have with your health care provider.

## 2023-12-08 ENCOUNTER — APPOINTMENT (OUTPATIENT)
Dept: PULMONOLOGY | Facility: CLINIC | Age: 63
End: 2023-12-08

## 2024-08-07 ENCOUNTER — OUTPATIENT (OUTPATIENT)
Dept: OUTPATIENT SERVICES | Facility: HOSPITAL | Age: 64
LOS: 1 days | End: 2024-08-07
Payer: MEDICAID

## 2024-08-07 DIAGNOSIS — R05.1 ACUTE COUGH: ICD-10-CM

## 2024-08-07 PROCEDURE — 70220 X-RAY EXAM OF SINUSES: CPT

## 2024-08-07 PROCEDURE — 70220 X-RAY EXAM OF SINUSES: CPT | Mod: 26

## 2024-08-07 PROCEDURE — 71046 X-RAY EXAM CHEST 2 VIEWS: CPT

## 2024-08-07 PROCEDURE — 71046 X-RAY EXAM CHEST 2 VIEWS: CPT | Mod: 26

## 2024-08-08 DIAGNOSIS — R05.1 ACUTE COUGH: ICD-10-CM

## 2025-05-21 ENCOUNTER — APPOINTMENT (OUTPATIENT)
Dept: RHEUMATOLOGY | Facility: CLINIC | Age: 65
End: 2025-05-21

## 2025-06-23 NOTE — ED ADULT NURSE NOTE - NS ED NURSE LEVEL OF CONSCIOUSNESS MENTAL STATUS
06/23/2025  Mary Johansen is a 56 y.o., female.      Pre-op Assessment    I have reviewed the Patient Summary Reports.     I have reviewed the Nursing Notes. I have reviewed the NPO Status.   I have reviewed the Medications.     Review of Systems  Anesthesia Hx:  No problems with previous Anesthesia                Social:  Non-Smoker, No Alcohol Use       Hematology/Oncology:  Hematology Normal   Oncology Normal                                   EENT/Dental:  EENT/Dental Normal           Cardiovascular:     Hypertension                                          Pulmonary:  Pulmonary Normal                       Renal/:  Renal/ Normal                 Hepatic/GI:  Hepatic/GI Normal                    Musculoskeletal:  Arthritis               Neurological:  Neurology Normal                                      Endocrine:  Endocrine Normal          Obesity / BMI > 30  Dermatological:  Skin Normal    Psych:  Psychiatric Normal                    Physical Exam  General: Well nourished    Airway:  Mallampati: II / II  Mouth Opening: Normal  TM Distance: > 6 cm  Tongue: Normal  Neck ROM: Normal ROM    Chest/Lungs:  Clear to auscultation, Normal Respiratory Rate    Heart:  Rate: Normal  Rhythm: Regular Rhythm        Anesthesia Plan  Type of Anesthesia, risks & benefits discussed:    Anesthesia Type: Gen Supraglottic Airway, Regional, Spinal  Intra-op Monitoring Plan: Standard ASA Monitors  Post Op Pain Control Plan: multimodal analgesia  Induction:  IV  Informed Consent: Informed consent signed with the Patient and all parties understand the risks and agree with anesthesia plan.  All questions answered. Patient consented to blood products? Yes  ASA Score: 2  Day of Surgery Review of History & Physical: H&P Update referred to the surgeon/provider.I have interviewed and examined the patient. I have reviewed  the patient's H&P dated:     Ready For Surgery From Anesthesia Perspective.     .       Awake